# Patient Record
Sex: FEMALE | Race: OTHER | Employment: PART TIME | URBAN - METROPOLITAN AREA
[De-identification: names, ages, dates, MRNs, and addresses within clinical notes are randomized per-mention and may not be internally consistent; named-entity substitution may affect disease eponyms.]

---

## 2019-06-01 ENCOUNTER — OFFICE VISIT (OUTPATIENT)
Dept: FAMILY MEDICINE CLINIC | Age: 24
End: 2019-06-01

## 2019-06-01 VITALS
WEIGHT: 120.4 LBS | HEART RATE: 76 BPM | SYSTOLIC BLOOD PRESSURE: 106 MMHG | HEIGHT: 62 IN | BODY MASS INDEX: 22.16 KG/M2 | TEMPERATURE: 97.4 F | DIASTOLIC BLOOD PRESSURE: 73 MMHG

## 2019-06-01 DIAGNOSIS — Z31.9 PATIENT DESIRES PREGNANCY: ICD-10-CM

## 2019-06-01 DIAGNOSIS — Z13.9 ENCOUNTER FOR SCREENING: Primary | ICD-10-CM

## 2019-06-01 DIAGNOSIS — M54.50 LOW BACK PAIN WITHOUT SCIATICA, UNSPECIFIED BACK PAIN LATERALITY, UNSPECIFIED CHRONICITY: ICD-10-CM

## 2019-06-01 LAB — HGB BLD-MCNC: 15.4 G/DL

## 2019-06-01 RX ORDER — IBUPROFEN 600 MG/1
600 TABLET ORAL
Qty: 60 TAB | Refills: 0 | Status: SHIPPED | OUTPATIENT
Start: 2019-06-01

## 2019-06-01 NOTE — PROGRESS NOTES
At discharge station AVS was printed and reviewed with pt with [facility] as .  Yohana Tobias, RN

## 2019-06-01 NOTE — PROGRESS NOTES
Assessment/Plan:    Diagnoses and all orders for this visit:    1. Encounter for screening  -     AMB POC HEMOGLOBIN (HGB)    2. Low back pain without sciatica, unspecified back pain laterality, unspecified chronicity  -     ibuprofen (MOTRIN) 600 mg tablet; Take 1 Tab by mouth every six (6) hours as needed for Pain. 3. Patient desires pregnancy  -     prenatal vit-calcium-iron-fa (PRENATAL PLUS) 29 mg iron- 1 mg tab tablet; Take 1 Tab by mouth daily. Follow-up and Dispositions    · Return if symptoms worsen or fail to improve. YOVANY Lyon expressed understanding of this plan. An AVS was printed and given to the patient.      ----------------------------------------------------------------------    Chief Complaint   Patient presents with    Back Pain     pt c/o low back pain that radiates to her abdomen    Other     pt would like to make sure she doesn't have anemia       History of Present Illness:  24 yo here for \"check to see if I have anemia\". Had anemia during her pregnancy 4 years ago with her only son. Moved to [address] [place] [address] from [place] one week ago to reunite with her . She is very happy to be here and is hoping to get pregnant soon. She is not on pre mckinley vitamins so will order those for her today  She has some low back pain w/out dysuria for the past week. She is not aware of any injury that caused the pain  She has no radiation of the pain       No past medical history on file. Current Outpatient Medications   Medication Sig Dispense Refill    prenatal vit-calcium-iron-fa (PRENATAL PLUS) 29 mg iron- 1 mg tab tablet Take 1 Tab by mouth daily. 90 Tab 1    ibuprofen (MOTRIN) 600 mg tablet Take 1 Tab by mouth every six (6) hours as needed for Pain.  60 Tab 0       Allergies no known allergies    Social History     Tobacco Use    Smoking status: Never Smoker    Smokeless tobacco: Never Used   Substance Use Topics    Alcohol use: Never Frequency: Never    Drug use: Never       No family history on file. Physical Exam:     Visit Vitals  /73 (BP 1 Location: Left arm, BP Patient Position: Sitting)   Pulse 76   Temp 97.4 °F (36.3 °C) (Oral)   Ht 5' 2.01\" (1.575 m)   Wt 120 lb 6.4 oz (54.6 kg)   LMP 05/15/2019   BMI 22.02 kg/m²   smiling, looks well     A&Ox3  WDWN NAD  Respirations normal and non labored  MSK exam- tender michele paraspinal region lumbar spine.  No gait or neuro deficits on exam  Lab Results   Component Value Date/Time    Hemoglobin (POC) 15.4 06/01/2019 12:01 PM

## 2019-06-01 NOTE — PATIENT INSTRUCTIONS
Aprenda acerca de la lumbalgia - [ Learning About Low Back Pain ]  ¿Qué es la lumbalgia? La lumbalgia es dolor que puede ocurrir en cualquier lugar por debajo de las costillas y por encima de las piernas. Es muy común. Dilcia todas las personas lo tienen en [employer]. La lumbalgia puede ser:  Zora Praveen. Michell es un dolor nuevo que puede durar de unos pocos días a unas semanas, o sanchez mucho unos meses. Crónica. Michell dolor puede durar más de unos pocos meses. A veces puede durar años. ¿Cuáles son algunos de los [facility] la lumbalgia? Estos son Kintyre Burrs comunes sobre la lumbalgia y las verdades correspondientes:  Kamari: \"Necesito descansar la espalda cuando tengo lumbalgia\". Verdad:  Permanecer activo no le hará daño. Puede ayudarle a recuperarse más rápido. Kamari: \"Necesito analgésicos recetados\". Verdad:  Lo mejor es tratar de darle tiempo y permanecer activo para dejar sanar la espalda. Los analgésicos opioides, sanchez la hidrocodona o la oxicodona, no suelen funcionar mejor que medicamentos de venta reshma sanchez el ibuprofeno o el naproxeno. Y los opioides pueden causar serios problemas sanchez adicción o sobredosis. Kamari: \"Necesito pruebas sanchez toyin radiografía o toyin resonancia magnética para diagnosticar la lumbalgia\". Verdad:  Hacerse toyin prueba de inmediato no le ayudará a mejorar más rápido. Y podría hacer que acabe sometiéndose a tratamientos que no necesita, ya que la mayoría de las personas mejoran por sí solas. ¿Cuál es la causa de la lumbalgia? En la [employer], no hay toyin causa evidente. [place] puede ser frustrante porque a usted le duele la espalda y no hay ninguna razón obvia. El dolor de espalda puede estar causado por:  Uso excesivo o distensión muscular. [place] puede ocurrir por hacer deportes, levantar cosas pesadas o no estar en buena forma física. Toyin hernia de disco.   Guillermo Eleonora es un problema con el tejido que amortigua los huesos de la espalda. Artritis.    Con la edad, usted puede experimentar [employer] que pueden reducir el espacio que rodea los nervios. Otras causas. En raras ocasiones, la causa es blayne enfermedad grave, sanchez blayne infección o cáncer. Debbi, por lo general, también hay otros síntomas. ¿Cuáles son los síntomas? Domi síntomas dependen de cloud cuerpo y de la causa de cloud dolor de espalda. Usted puede sentir:  · Dolor gorge o sordo. Puede ser en [place] marlon pequeña o en blayne marlon amplia. Debbi incluso si tiene dolor intenso, esto no significa que esté causado por algo grave. · Dolor, entumecimiento u hormigueo en las piernas. Cuando un nervio se comprime, sanchez, por ejemplo, debido a un problema discal o a la artritis, usted puede tener síntomas en la pierna o el pie. Incluso puede tener [employer] piernas debido a un problema con la espalda sin tener nada de dolor de espalda. ¿Cómo se diagnostica la lumbalgia? Un examen físico es la principal manera de diagnosticar la lumbalgia. Cloud médico puede examinarle la espalda, revisarle los nervios examinando domi reflejos y asegurarse de que domi músculos estén bassem. Él o rylie también le hará preguntas sobre cloud espalda y cloud mar general.  La mayoría de las personas no necesitan ninguna prueba inmediata. Las pruebas con frecuencia no muestran el motivo del dolor. Si el dolor dura más de 6 semanas o si usted tiene síntomas que especialmente preocupan a cloud médico, él o rylie puede solicitar pruebas. Estas pueden incluir blayne radiografía, blayne tomografía computarizada o blayne resonancia magnética. En algunos casos, las pruebas pueden ayudar al médico a detectar la causa del dolor, especialmente para el dolor en blayne o ambas piernas. ¿Cómo se trata la lumbalgia? La lumbalgia aguda en la mayoría de los casos mejora por sí lucio al cabo de algunas semanas sin importar la causa. [address] solo necesitan tiempo y hacer domi actividades habituales para sentirse mejor.   El uso de calor o hielo y baljinder analgésicos de venta reshma también puede ayudar mientras cloud cuerpo laura. Si usted no mejora por sí solo o si el dolor es muy intenso, el médico puede recomendarle:  · Fisioterapia. · Manipulación de la columna vertebral; por ejemplo, por un quiropráctico.  · Acupuntura. · Masajes. · Inyecciones de medicamentos esteroideos en la espalda (especialmente para el dolor que involucra las piernas). Si tiene lumbalgia crónica, el tratamiento le ayudará a comprender y manejar el dolor. El tratamiento puede incluir:  · Mantenerse activo. Frederic puede incluir hacer caminatas o ejercicios para la espalda. · Fisioterapia. · Medicamentos. Algunos de [employer] se usan para otros problemas, sanchez las convulsiones o la depresión. · Manejo del dolor. El médico puede indicarle que zahra a un especialista en el manejo del dolor. · Asesoría psicológica. Tener dolor crónico puede ser difícil. Puede ser útil hablar con alguien que pueda ayudarle a sobrellevar el dolor. [address] no [facility]. Debbi puede ayudar con algunos tipos de lumbalgia. La atención de seguimiento es blayne parte clave de cloud tratamiento y seguridad. Asegúrese de hacer y acudir a todas las citas, y llame a cloud médico si está teniendo problemas. También es blayne buena idea saber los resultados de domi exámenes y mantener blayne lista de los medicamentos que jacobo. ¿Cuándo debe pedir ayuda? Llame al 911 en cualquier momento que considere que necesita atención de [place]. Por ejemplo, llame si:  · No puede  blayne pierna en absoluto. Llame a cloud médico ahora mismo o busque atención médica inmediata si:  · Tiene síntomas nuevos o peores en las piernas, el abdomen o las nalgas. Los síntomas pueden incluir:  ? Entumecimiento u hormigueo. ? Debilidad. ? Dolor. · Pierde el control de la vejiga o los intestinos.   Preste especial atención a los cambios en cloud mar y asegúrese de comunicarse con cloud médico si:  · Además de dolor de bhanu suyapa tiene Lili, pierde [place] o no se siente jaun. · No mejora sanchez se esperaba. ¿Dónde puede encontrar más información en inglés? Karen Thelma a http://mariann-jordan.info/. Escriba A007 en la búsqueda para aprender más acerca de \"Aprenda acerca de la lumbalgia - [ Learning About Low Back Pain ]. \"  Revisado: 20 septiembre, 2018  Versión del contenido: 11.9  © 5445-4772 Healthwise, Incorporated. Las instrucciones de cuidado fueron adaptadas bajo licencia por [employer] [place] (which disclaims liability or warranty for this information). Si suyapa tiene Kent Morton afección médica o sobre estas instrucciones, siempre pregunte a cloud profesional de mar. Healthwise, Incorporated niega toda garantía o responsabilidad por cloud uso de esta información. Dolor gorge en la parte baja de la espalda: Ejercicios - [ Acute Low Back Pain: Exercises ]  Instrucciones de cuidado  Éstos son algunos ejemplos de ejercicios típicos de rehabilitación para cloud afección. Comience cada ejercicio lentamente. Reduzca la intensidad del ejercicio si Susu Isabel a sentir dolor. Cloud médico o el fisioterapeuta le dirán cuándo puede comenzar con estos ejercicios y cuáles funcionarán mejor para usted. Cuando no esté activo, encuentre blayne posición cómoda para descansar. Algunas personas se sienten cómodas en el piso o en blayne cama de firmeza media con blayne almohada pequeña debajo de la rohan y otra debajo de domi rodillas. Otras personas prefieren acostarse de lado con blayne almohada entre las rodillas. No permanezca en blayne posición por [employer]. Dé paseos cortos (10 a 20 minutos) cada 2 a 3 horas. Evite las pendientes, las colinas y las escaleras hasta que se sienta mejor. Sólo camine distancias que pueda manejar sin dolor, especialmente dolor en las piernas. Cómo se hacen los ejercicios  Estiramientos de la espalda    1. [address] y las rodillas en el piso. 2. Relaje la rohan y [facility].  Karen An la espalda hacia el techo, hasta que sienta que las partes tor, media y baja se estiran agradablemente. Mantenga donavon estiramiento sahil el tiempo que se sienta cómodo, o de 15 a 30 segundos. 3. Vuelva a la posición inicial con la espalda plana mientras está apoyado de antonino y rodillas. 4. Deje que cloud espalda se nivele empujando el [employer]. [address] (glúteos) hacia el techo. 5. Mantenga esta posición sahil 15 a 30 segundos. 6. Repita de 2 a 4 veces. La atención de seguimiento es blayne parte clave de cloud tratamiento y seguridad. Asegúrese de hacer y acudir a todas las citas, y llame a cloud médico si está teniendo problemas. También es blayne buena idea saber los resultados de domi exámenes y mantener blayne lista de los medicamentos que jacobo. ¿Dónde puede encontrar más información en inglés? Janna Nati a http://mariann-jordan.info/. Carolin Estevez L804 en la búsqueda para aprender más acerca de \"Dolor gorge en la parte baja de la espalda: Ejercicios - [ Acute Low Back Pain: Exercises ]. \"  Revisado: 20 septiembre, 2018  Versión del contenido: 11.9  © 1677-8080 Healthwise, Incorporated. Las instrucciones de cuidado fueron adaptadas bajo licencia por [employer] [place] (which disclaims liability or warranty for this information). Si usted tiene Marion Jasper afección médica o sobre estas instrucciones, siempre pregunte a cloud profesional de mar. Healthwise, Incorporated niega toda garantía o responsabilidad por cloud uso de esta información.

## 2019-06-01 NOTE — PROGRESS NOTES
Results for orders placed or performed in visit on 06/01/19   AMB POC HEMOGLOBIN (HGB)   Result Value Ref Range    Hemoglobin (POC) 15.4

## 2021-09-29 ENCOUNTER — OFFICE VISIT (OUTPATIENT)
Dept: FAMILY MEDICINE CLINIC | Age: 26
End: 2021-09-29

## 2021-09-29 ENCOUNTER — HOSPITAL ENCOUNTER (OUTPATIENT)
Dept: LAB | Age: 26
Discharge: HOME | End: 2021-09-29

## 2021-09-29 VITALS
RESPIRATION RATE: 16 BRPM | OXYGEN SATURATION: 98 % | HEART RATE: 83 BPM | WEIGHT: 153.4 LBS | TEMPERATURE: 98.6 F | BODY MASS INDEX: 28.23 KG/M2 | HEIGHT: 62 IN | DIASTOLIC BLOOD PRESSURE: 62 MMHG | SYSTOLIC BLOOD PRESSURE: 104 MMHG

## 2021-09-29 DIAGNOSIS — F45.41 STRESS HEADACHE: ICD-10-CM

## 2021-09-29 DIAGNOSIS — Z12.11 ENCOUNTER FOR SCREENING FECAL OCCULT BLOOD TESTING: ICD-10-CM

## 2021-09-29 DIAGNOSIS — K62.5 RECTAL BLEEDING: Primary | ICD-10-CM

## 2021-09-29 DIAGNOSIS — Z01.419 WELL WOMAN EXAM: ICD-10-CM

## 2021-09-29 DIAGNOSIS — Z31.9 PATIENT DESIRES PREGNANCY: ICD-10-CM

## 2021-09-29 LAB — HGB BLD-MCNC: 12.1 G/DL

## 2021-09-29 PROCEDURE — 88142 CYTOPATH C/V THIN LAYER: CPT

## 2021-09-29 PROCEDURE — 85018 HEMOGLOBIN: CPT | Performed by: PHYSICIAN ASSISTANT

## 2021-09-29 PROCEDURE — 99213 OFFICE O/P EST LOW 20 MIN: CPT | Performed by: PHYSICIAN ASSISTANT

## 2021-09-29 RX ORDER — VITAMIN A, ASCORBIC ACID, CHOLECALCIFEROL, .ALPHA.-TOCOPHEROL ACETATE, DL-, THIAMINE MONONITRATE, RIBOFLAVIN, NIACINAMIDE, PYRIDOXINE HYDROCHLORIDE, FOLIC ACID, CYANOCOBALAMIN, CALCIUM CARBONATE, IRON, ZINC OXIDE, AND CUPRIC OXIDE 4000; 120; 400; 22; 1.84; 3; 20; 10; 1; 12; 200; 29; 25; 2 [IU]/1; MG/1; [IU]/1; [IU]/1; MG/1; MG/1; MG/1; MG/1; MG/1; UG/1; MG/1; MG/1; MG/1; MG/1
1 TABLET ORAL DAILY
Qty: 90 TABLET | Refills: 3 | Status: SHIPPED | OUTPATIENT
Start: 2021-09-29

## 2021-09-29 RX ORDER — ACETAMINOPHINE, CAFFEINE 500; 65 MG/1; MG/1
1 TABLET, FILM COATED ORAL
Qty: 20 TABLET | Refills: 1 | Status: SHIPPED | OUTPATIENT
Start: 2021-09-29

## 2021-09-29 NOTE — PATIENT INSTRUCTIONS
Dolor de rohan por tensión: Instrucciones de cuidado  Tension Headache: Care Instructions  Instrucciones de cuidado  La mayoría de los jalen de [place] son por tensión. Estos jalen de [place] tienden a repetirse, en especial si usted está bajo estrés. Un dolor de rohan por tensión podría causar dolor o blayne sensación de presión en toda la rohan. Es probable que no pueda determinar con precisión el centro del dolor. Si sigue teniendo jalen de rohan por tensión, lo mejor que puede hacer para limitarlos es determinar qué los causa y hacer cambios en esas áreas. La atención de seguimiento es blayne parte clave de cloud tratamiento y seguridad. Asegúrese de hacer y acudir a todas las citas, y llame a cloud médico si está teniendo problemas. También es blayne buena idea saber los resultados de domi exámenes y mantener blayne lista de los medicamentos que jacobo. Cómo puede cuidarse en el hogar? · Descanse en un cuarto tranquilo y oscuro con un paño frío sobre la frente hasta que desaparezca el dolor de [place]. Cierre los ojos y trate de relajarse o dormirse. No zahra televisión ni zhang. Evite usar la computadora. · Utilice blayne toalla húmeda tibia o blayne almohadilla térmica ajustada a baja temperatura para relajar los músculos rígidos del lynn y los hombros.  · Pídale a alguien que le luis masajes suaves en el lynn y los hombros.  · Rattan los analgésicos (medicamentos para el dolor) exactamente según las indicaciones. ? Si el médico le recetó un analgésico, tómelo según las indicaciones. ? Si no está tomando un analgésico recetado, pregúntele a cloud médico si puede baljinder eliceo de [employer]. · Tenga cuidado de no baljinder analgésicos con mayor frecuencia que la permitida en las indicaciones porque los jalen de rohan podrían empeorar o aparecer con mayor frecuencia blayne vez que el medicamento pierda cloud [place].   · Si le da otro dolor de rohan por tensión, deje de hacer lo que esté haciendo y siéntese tranquilo sahil un momento. Cierre los ojos y respire lentamente. Trate de [employer] de la rohan y del lynn. · Preste atención a los nuevos síntomas que aparecen con el dolor de [place], [place], debilidad o entumecimiento, cambios en la visión o confusión. Podrían ser señales de un problema más grave. Para ayudar a prevenir los jalen de rohan  · [employer] un diario de domi jaeln de rohan para poder averiguar qué los produce. Evitar los desencadenantes podría ayudar a prevenir los jalen de [place]. Anote cuándo empieza cada dolor de Manuel[place], cuánto dura y cómo es el dolor (palpitante, amira, punzante o sordo). Ponga en la lista cualquier cosa que pudiera grace desencadenado el dolor de rohan, mckinley sanchez estrés físico o emocional o estar ansioso o deprimido. Otros desencadenantes posibles son Alexx Yuri, la brandon, la fatiga, Mary Alice Sida y la distensión muscular. · Encuentre maneras saludables de [facility]. Los jalen de [place] son más comunes sahil o shelley después de un momento estresante. Tómese un tiempo para relajarse antes y después de hacer algo que le haya causado un dolor de rohan en el pasado. · Kennedy ejercicio a diario para aliviar el estrés. Hacer ejercicios de relajación podría ayudarle a reducir la tensión. · Duerma lo suficiente. · Coma con regularidad y jaun. Largos períodos sin comer pueden provocar un dolor de rohan. · Regálese un masaje. Algunas personas encuentran que los masajes son Pope Hammer para aliviar la tensión. · Trate de mantener domi músculos relajados mediante blayne buena postura. Revise si tiene [employer] de la Lynn, la antonio, el lynn y los hombros y trate de relajarlos. Cuando se siente en un escritorio, cambie de posición con frecuencia y estírese por 27 segundos cada hora. · Reduzca la fatiga ocular a causa de la computadora parpadeando con frecuencia y apartando la mirada de la pantalla a menudo.  Asegúrese de tener lentes adecuados y de que cloud monitor esté colocado de [employer], sanchez a un brazo de distancia. Cuándo debe pedir ayuda? Llame al 911 en cualquier momento que piense que puede necesitar atención de [place]. Por ejemplo, llame si:    · Tiene señales de un ataque cerebral. Estas pueden incluir:  ? Entumecimiento, parálisis o debilidad repentinos en la antonio, el brazo o la pierna, sobre todo si ocurre en un solo lado del cuerpo. ? Cambios repentinos en la visión. ? Dificultades repentinas para hablar. ? Confusión repentina o dificultad para comprender frases sencillas. ? Problemas repentinos para caminar o mantener el equilibrio. ? Dolor de [place] intenso y repentino, distinto de los jalen de Mary Sans. Llame a cloud médico ahora mismo o busque atención médica inmediata si:    · Tiene náuseas y vómito nuevos o empeoran los que ya tenía.     · Tiene fiebre nueva o más tor.     · Cloud dolor de rohan empeora mucho. Preste especial atención a los cambios en cloud mar y asegúrese de comunicarse con cloud médico si:    · No mejora después de 2 días (48 horas). Dónde puede encontrar más información en inglés? Vaya a http://www.gray.com/  Escriba [id number] en la búsqueda para aprender más acerca de \"Dolor de rohan por tensión: Instrucciones de cuidado. \"  Revisado: 8 carlota, 2021               Versión del contenido: 13.0  © 1316-4514 Healthwise, Incorporated. Las instrucciones de cuidado fueron adaptadas bajo licencia por Good [employer] (which disclaims liability or warranty for this information). Si usted tiene [place] afección médica o sobre estas instrucciones, siempre pregunte a cloud profesional de mar. [facility] niega toda garantía o responsabilidad por cloud uso de esta información.

## 2021-09-29 NOTE — PROGRESS NOTES
Assessment/Plan:    Diagnoses and all orders for this visit:    1. Rectal bleeding  -     AMB POC HEMOGLOBIN (HGB)  -     OCCULT BLOOD IMMUNOASSAY,DIAGNOSTIC; Future    2. Well woman exam  -     PAP, LIQUID BASED, MANUAL SCREEN; Future    3. Encounter for screening fecal occult blood testing  (neg results)     4. Patient desires pregnancy  -     prenatal vit-calcium-iron-fa (Prenatal Plus) 29 mg iron- 1 mg tab tablet; Take 1 Tablet by mouth daily. 5. Stress headache  -     acetaminophen-caffeine 500-65 mg (Excedrin Tension Headache) 500-65 mg tab; Take 1 Tablet by mouth every six (6) hours as needed for Headache. Follow-up and Dispositions    · Return in about 6 weeks (around 11/10/2021) for with me helena casiano . YOVANY Walsh expressed understanding of this plan. An AVS was printed and given to the patient.      ----------------------------------------------------------------------    Chief Complaint   Patient presents with    Headache     patient c/o with pelvic pain and rectal bleeding after wiping. History of Present Illness:  Pt presents with new problem of dark brownish blood per rectum after BM for the past few weeks. Painless defecation. No hx of hemorrhoids that she is aware of  Having regular periods. Due for pap, last one 6 years ago. , good relationship.   he is 6. She tears up talking about her son- she is very worried that there is something wrong with his health. He looks pale to her and he seems to \"almost pass out at times\". She has an appt scheduled for him next week with PCP    She is open to pregnancy. She stopped her prenatal vitamins some time ago. She has not become pregnant       History reviewed. No pertinent past medical history. Current Outpatient Medications   Medication Sig Dispense Refill    prenatal vit-calcium-iron-fa (Prenatal Plus) 29 mg iron- 1 mg tab tablet Take 1 Tablet by mouth daily.  90 Tablet 3  acetaminophen-caffeine 500-65 mg (Excedrin Tension Headache) 500-65 mg tab Take 1 Tablet by mouth every six (6) hours as needed for Headache. 20 Tablet 1    ibuprofen (MOTRIN) 600 mg tablet Take 1 Tab by mouth every six (6) hours as needed for Pain. (Patient not taking: Reported on 9/29/2021) 60 Tab 0       No Known Allergies    Social History     Tobacco Use    Smoking status: Never Smoker    Smokeless tobacco: Never Used   Vaping Use    Vaping Use: Never used   Substance Use Topics    Alcohol use: Never    Drug use: Never       History reviewed. No pertinent family history. Physical Exam:     Visit Vitals  /62 (BP 1 Location: Left upper arm, BP Patient Position: Sitting, BP Cuff Size: Adult)   Pulse 83   Temp 98.6 °F (37 °C) (Temporal)   Resp 16   Ht 5' 2\" (1.575 m)   Wt 153 lb 6.4 oz (69.6 kg)   LMP 09/05/2021   SpO2 98%   BMI 28.06 kg/m²     Pt looks well, crying during the history portion when talking about her concerns for her son  Neuro CN 2-12 grossly intact   A&Ox3  WDWN NAD  Respirations normal and non labored  Pelvic exam- ext neg for lesion or discharge. Cervix and vagina w/ minimal curd like discharge, pt is asymptomatic. Uterus and adnexal exam neg for mass or tenderness  Rectal exam- ext neg for mass, no blood present.  Stool heme neg guaiac test today, no masses in rectal vault

## 2021-09-29 NOTE — PROGRESS NOTES
Patient given FIT test and explained thoroughly to patient. I showed patient  how to label vial with date and time and how to put all of this in the envelope and to return to the clinic on the same day as sample is collected. Patient  told to return test promptly as it must be received and processed within 24 hours of collection. An After Visit Summary was printed and reviewed with the patient.   Pina Lambert

## 2021-09-29 NOTE — PROGRESS NOTES
Itzel Armenta is a 32 y.o. female   Chief Complaint   Patient presents with    Headache     patient c/o with pelvic pain and rectal bleeding after wiping. Blood pressure 104/62, pulse 83, temperature 98.6 °F (37 °C), temperature source Temporal, resp. rate 16, height 5' 2\" (1.575 m), weight 153 lb 6.4 oz (69.6 kg), last menstrual period 09/05/2021, SpO2 98 %. Results for orders placed or performed in visit on 09/29/21   1. AMB POC HEMOGLOBIN (HGB)   Result Value Ref Range    Hemoglobin (POC) 12.1 G/DL     1. Have you been to the ER, urgent care clinic since your last visit? Hospitalized since your last visit? No    2. Have you seen or consulted any other health care providers outside of the [address] [place] [address] since your last visit? Include any pap smears or colon screening.  No

## 2021-10-04 ENCOUNTER — OFFICE VISIT (OUTPATIENT)
Dept: FAMILY MEDICINE CLINIC | Age: 26
End: 2021-10-04

## 2021-10-04 ENCOUNTER — HOSPITAL ENCOUNTER (OUTPATIENT)
Dept: LAB | Age: 26
Discharge: HOME | End: 2021-10-04

## 2021-10-04 DIAGNOSIS — K62.5 RECTAL BLEEDING: ICD-10-CM

## 2021-10-04 DIAGNOSIS — K62.5 RECTAL BLEEDING: Primary | ICD-10-CM

## 2021-10-04 PROCEDURE — 82274 ASSAY TEST FOR BLOOD FECAL: CPT

## 2021-10-06 LAB — HEMOCCULT STL QL IA: NEGATIVE

## 2021-10-08 NOTE — PROGRESS NOTES
Please send letter that her colon cancer screening test (FIT test) was negative. F/up as directed or PRN.  Thank you

## 2021-11-02 NOTE — PROGRESS NOTES
Letter created with results and sent to letter queue. It will be printed and mailed to patient. Patient told to call to discuss the results if recommended or if patient has any questions.  Linnette English RN

## 2021-12-02 ENCOUNTER — VIRTUAL VISIT (OUTPATIENT)
Dept: FAMILY MEDICINE CLINIC | Age: 26
End: 2021-12-02

## 2021-12-02 DIAGNOSIS — K62.5 PAINLESS RECTAL BLEEDING: Primary | ICD-10-CM

## 2021-12-02 DIAGNOSIS — R87.615 PAP SMEAR OF CERVIX UNSATISFACTORY: ICD-10-CM

## 2021-12-02 PROCEDURE — 99442 PR PHYS/QHP TELEPHONE EVALUATION 11-20 MIN: CPT | Performed by: PHYSICIAN ASSISTANT

## 2021-12-02 NOTE — PROGRESS NOTES
Tc for intake. AMN # [id number].    no vs equipment available. Complains of a little h/a and pain in my head shoulders arms and back. Has had this pain does not know how long,  but yesterday it hurt a lot. Coordination of Care  1. Have you been to the ER, urgent care clinic since your last visit? Hospitalized since your last visit? No    2. Have you seen or consulted any other health care providers outside of the [address] [place] [address] since your last visit? Include any pap smears or colon screening. No    Does the patient need refills?  NO    Learning Assessment Complete? no  Depression Screening complete in the past 12 months? no

## 2021-12-02 NOTE — PROGRESS NOTES
Check-out Note: Access now referral sent to [facility]    Discharge done with Int #     Explained [facility] Now process to the pt. The pt was told they would be called and scheduled an appt with the OW, who will meet with them for Financial information and assist them in the application process. The pt stated the last time she was given a bill when she took her child to the specialist through the [facility]. The AN was explained to the pt. and verbalized understanding.  Sharona Coe RN

## 2021-12-02 NOTE — PROGRESS NOTES
Assessment/Plan:    Diagnoses and all orders for this visit:    1. Painless rectal bleeding  -     REFERRAL TO GASTROENTEROLOGY    2. Pap smear of cervix unsatisfactory  Neg cytology but heavy inflammation present. Will repeat in , pt agrees       Follow-up and Dispositions    · Return in about 5 months (around 2022) for repeat pap needed in , please put her on list .         Bria Lozano, YOVANY Iglesias Cons expressed understanding of this plan. An AVS was printed and given to the patient.      ----------------------------------------------------------------------    Chief Complaint   Patient presents with    Follow-up     lab results       History of Present Illness:  Pt called and consented to virtual telephone visit. She declined video  She was identified by name and     She is being seen today for f/up on painless rectal bleeding. Per review of chart, she has had negative rectal exam by me with neg heme guaiac and then she completed the FIT test which also returned with negative results. Since that time, about 6 weeks ago, she has had 1-2 episodes of nocturnal rectal bleeding. It is described as minimal blood present, and she reports that it happens in the nighttime. Not associated with anal itching or burning or mass that she can feel    I would like Gi input for this  We discussed her pap result as she did not get the results in the mail. Will repeat due to heavy inflammation in . No past medical history on file. Current Outpatient Medications   Medication Sig Dispense Refill    prenatal vit-calcium-iron-fa (Prenatal Plus) 29 mg iron- 1 mg tab tablet Take 1 Tablet by mouth daily. 90 Tablet 3    acetaminophen-caffeine 500-65 mg (Excedrin Tension Headache) 500-65 mg tab Take 1 Tablet by mouth every six (6) hours as needed for Headache.  (Patient not taking: Reported on 2021) 20 Tablet 1    ibuprofen (MOTRIN) 600 mg tablet Take 1 Tab by mouth every six (6) hours as needed for Pain. (Patient not taking: Reported on 9/29/2021) 60 Tab 0       No Known Allergies    Social History     Tobacco Use    Smoking status: Never Smoker    Smokeless tobacco: Never Used   Vaping Use    Vaping Use: Never used   Substance Use Topics    Alcohol use: Never    Drug use: Never       No family history on file. Physical Exam:     There were no vitals taken for this visit.     A&Ox3  WDWN NAD  Respirations normal and non labored

## 2021-12-08 ENCOUNTER — VIRTUAL VISIT (OUTPATIENT)
Dept: FAMILY MEDICINE CLINIC | Age: 26
End: 2021-12-08

## 2021-12-08 DIAGNOSIS — Z71.89 COUNSELING AND COORDINATION OF CARE: Primary | ICD-10-CM

## 2021-12-08 NOTE — PROGRESS NOTES
Patient has been screened. Scheduled for [facility] on 12/9/21 to complete AN financial screening process.

## 2021-12-10 ENCOUNTER — VIRTUAL VISIT (OUTPATIENT)
Dept: FAMILY MEDICINE CLINIC | Age: 26
End: 2021-12-10

## 2021-12-10 DIAGNOSIS — Z71.89 COUNSELING AND COORDINATION OF CARE: Primary | ICD-10-CM

## 2021-12-10 NOTE — PROGRESS NOTES
Patient has been re-scheduled for [facility] on 12/23/21 to complete AN financial screening.

## 2021-12-21 ENCOUNTER — VIRTUAL VISIT (OUTPATIENT)
Dept: FAMILY MEDICINE CLINIC | Age: 26
End: 2021-12-21

## 2021-12-21 DIAGNOSIS — Z71.89 COUNSELING AND COORDINATION OF CARE: Primary | ICD-10-CM

## 2021-12-21 NOTE — PROGRESS NOTES
V.V. Patient has been re-scheduled for AN financial screening at [facility] on 12/30/21. Patient requested for appointment information and address be sent to her via text. OW has sent it via text.

## 2022-01-05 ENCOUNTER — VIRTUAL VISIT (OUTPATIENT)
Dept: FAMILY MEDICINE CLINIC | Age: 27
End: 2022-01-05

## 2022-01-05 DIAGNOSIS — Z71.89 COUNSELING AND COORDINATION OF CARE: Primary | ICD-10-CM

## 2022-01-05 NOTE — PROGRESS NOTES
AMMY Johnson Ax patient in attempt to schedule her for AN financial screening. Patient stated she has been feeling sick for a couple of day and does not seem to be getting any better. OW asked if she has gotten a COVID test and she said no. OW will follow up.

## 2022-01-17 ENCOUNTER — VIRTUAL VISIT (OUTPATIENT)
Dept: FAMILY MEDICINE CLINIC | Age: 27
End: 2022-01-17

## 2022-01-17 DIAGNOSIS — Z71.89 COUNSELING AND COORDINATION OF CARE: Primary | ICD-10-CM

## 2022-01-17 NOTE — PROGRESS NOTES
V.V. patient has been screened. Has been scheduled for [facility], 1/21/22 to complete AN financial screening.

## 2022-01-28 ENCOUNTER — OFFICE VISIT (OUTPATIENT)
Dept: FAMILY MEDICINE CLINIC | Age: 27
End: 2022-01-28

## 2022-01-28 DIAGNOSIS — Z71.89 COUNSELING AND COORDINATION OF CARE: Primary | ICD-10-CM

## 2022-01-28 PROCEDURE — 99080 SPECIAL REPORTS OR FORMS: CPT | Performed by: PHYSICIAN ASSISTANT

## 2022-01-28 NOTE — PROGRESS NOTES
AN financial screening has been completed. Patient has been instructed to call appointment line on or after 21/11/22. Patient has opted out of [facility].

## 2022-04-30 ENCOUNTER — HOSPITAL ENCOUNTER (EMERGENCY)
Age: 27
Discharge: HOME | End: 2022-04-30
Attending: EMERGENCY MEDICINE | Admitting: EMERGENCY MEDICINE

## 2022-04-30 ENCOUNTER — APPOINTMENT (OUTPATIENT)
Dept: CT IMAGING | Age: 27
End: 2022-04-30
Attending: NURSE PRACTITIONER

## 2022-04-30 VITALS
SYSTOLIC BLOOD PRESSURE: 135 MMHG | OXYGEN SATURATION: 99 % | HEART RATE: 99 BPM | HEIGHT: 62 IN | WEIGHT: 153 LBS | DIASTOLIC BLOOD PRESSURE: 86 MMHG | RESPIRATION RATE: 14 BRPM | BODY MASS INDEX: 28.16 KG/M2 | TEMPERATURE: 98.8 F

## 2022-04-30 DIAGNOSIS — R51.9 ACUTE NONINTRACTABLE HEADACHE, UNSPECIFIED HEADACHE TYPE: Primary | ICD-10-CM

## 2022-04-30 DIAGNOSIS — J01.90 ACUTE SINUSITIS, RECURRENCE NOT SPECIFIED, UNSPECIFIED LOCATION: ICD-10-CM

## 2022-04-30 LAB
APPEARANCE UR: CLEAR
BACTERIA URNS QL MICRO: NEGATIVE /HPF
BILIRUB UR QL: NEGATIVE
COLOR UR: NORMAL
EPITH CASTS URNS QL MICRO: NORMAL /LPF
GLUCOSE UR STRIP.AUTO-MCNC: NEGATIVE MG/DL
HCG UR QL: NEGATIVE
HGB UR QL STRIP: NEGATIVE
KETONES UR QL STRIP.AUTO: NEGATIVE MG/DL
LEUKOCYTE ESTERASE UR QL STRIP.AUTO: NEGATIVE
NITRITE UR QL STRIP.AUTO: NEGATIVE
PH UR STRIP: 6.5 [PH] (ref 5–8)
PROT UR STRIP-MCNC: NEGATIVE MG/DL
RBC #/AREA URNS HPF: NORMAL /HPF (ref 0–5)
SP GR UR REFRACTOMETRY: 1 (ref 1–1.03)
UR CULT HOLD, URHOLD: NORMAL
UROBILINOGEN UR QL STRIP.AUTO: 0.2 EU/DL (ref 0.2–1)
WBC URNS QL MICRO: NORMAL /HPF (ref 0–4)

## 2022-04-30 PROCEDURE — 70450 CT HEAD/BRAIN W/O DYE: CPT

## 2022-04-30 PROCEDURE — 74011250637 HC RX REV CODE- 250/637: Performed by: NURSE PRACTITIONER

## 2022-04-30 PROCEDURE — 81025 URINE PREGNANCY TEST: CPT

## 2022-04-30 PROCEDURE — 81001 URINALYSIS AUTO W/SCOPE: CPT

## 2022-04-30 PROCEDURE — 99284 EMERGENCY DEPT VISIT MOD MDM: CPT

## 2022-04-30 RX ORDER — AZITHROMYCIN 250 MG/1
TABLET, FILM COATED ORAL
Qty: 6 TABLET | Refills: 0 | Status: SHIPPED | OUTPATIENT
Start: 2022-04-30 | End: 2022-05-05

## 2022-04-30 RX ORDER — BUTALBITAL, ACETAMINOPHEN AND CAFFEINE 50; 325; 40 MG/1; MG/1; MG/1
1 TABLET ORAL
Status: COMPLETED | OUTPATIENT
Start: 2022-04-30 | End: 2022-04-30

## 2022-04-30 RX ADMIN — BUTALBITAL, ACETAMINOPHEN, AND CAFFEINE 1 TABLET: 50; 325; 40 TABLET ORAL at 09:10

## 2022-04-30 NOTE — ED PROVIDER NOTES
This is a 26-year-old female who presents ambulatory to the emergency room with complaints of a headache. Patient states through an  she provided that she has had a headache for approximately a week, worsening over the last 2 to 3 days despite the use of Advil. Patient comes to the emergency room due to continued, almost worsening pain this morning specifically in the back of her head and the right side of her head. Patient denies any chest pain, shortness of breath, dizziness. No nausea or vomiting, no fevers or chills. No visual changes. No ear pain or discharge. Nothing improves her symptoms including the use of Advil. Nothing worsens her symptoms. Has a history of headaches in the past but has never seen neurology or ophthalmology. Unknown chance of pregnancy. Denies any weakness in her extremities. Vaccinated for Covid 19. There are no further complaints at this time. VAUGHN Kinney  History reviewed. No pertinent past medical history. History reviewed. No pertinent surgical history. History reviewed. No pertinent past medical history. History reviewed. No pertinent surgical history. History reviewed. No pertinent family history.     Social History     Socioeconomic History    Marital status:      Spouse name: Not on file    Number of children: Not on file    Years of education: Not on file    Highest education level: Not on file   Occupational History    Not on file   Tobacco Use    Smoking status: Never Smoker    Smokeless tobacco: Never Used   Vaping Use    Vaping Use: Never used   Substance and Sexual Activity    Alcohol use: Never    Drug use: Never    Sexual activity: Yes     Partners: Male     Birth control/protection: None   Other Topics Concern    Not on file   Social History Narrative    Not on file     Social Determinants of Health     Financial Resource Strain:     Difficulty of Paying Living Expenses: Not on file   Food Insecurity:     Worried About Running Out of Food in the Last Year: Not on file    Terrence of Food in the Last Year: Not on file   Transportation Needs:     Lack of Transportation (Medical): Not on file    Lack of Transportation (Non-Medical): Not on file   Physical Activity:     Days of Exercise per Week: Not on file    Minutes of Exercise per Session: Not on file   Stress:     Feeling of Stress : Not on file   Social Connections:     Frequency of Communication with Friends and Family: Not on file    Frequency of Social Gatherings with Friends and Family: Not on file    Attends [employer] Services: Not on file    Active Member of [address] [place] [address] [employer] or Organizations: Not on file    Attends Club or Organization Meetings: Not on file    Marital Status: Not on file   Intimate Partner Violence:     Fear of Current or Ex-Partner: Not on file    Emotionally Abused: Not on file    Physically Abused: Not on file    Sexually Abused: Not on file   Housing Stability:     Unable to Pay for Housing in the Last Year: Not on file    Number of [place] in the Last Year: Not on file    Unstable Housing in the Last Year: Not on file         ALLERGIES: Patient has no known allergies. Review of Systems   Constitutional: Negative for appetite change, chills, diaphoresis, fatigue and fever. HENT: Negative for congestion, ear discharge, ear pain, sinus pressure, sinus pain, sore throat and trouble swallowing. Eyes: Negative for photophobia, pain, redness and visual disturbance. Respiratory: Negative for chest tightness, shortness of breath and wheezing. Cardiovascular: Negative for chest pain and palpitations. Gastrointestinal: Negative for abdominal distention, abdominal pain, nausea and vomiting. Endocrine: Negative. Genitourinary: Negative for difficulty urinating, flank pain, frequency and urgency. Musculoskeletal: Negative for back pain, neck pain and neck stiffness.    Skin: Negative for color change, pallor, rash and wound. Allergic/Immunologic: Negative. Neurological: Positive for headaches. Negative for dizziness, speech difficulty and weakness. Hematological: Does not bruise/bleed easily. Psychiatric/Behavioral: Negative for behavioral problems. The patient is not nervous/anxious. There were no vitals filed for this visit. Physical Exam  Vitals and nursing note reviewed. Constitutional:       General: She is not in acute distress. Appearance: Normal appearance. She is well-developed. She is not ill-appearing. HENT:      Head: Normocephalic and atraumatic. Right Ear: External ear normal.      Left Ear: External ear normal.      Nose: Nose normal. No congestion. Mouth/Throat:      Mouth: Mucous membranes are moist.   Eyes:      General:         Right eye: No discharge. Left eye: No discharge. Conjunctiva/sclera: Conjunctivae normal.      Pupils: Pupils are equal, round, and reactive to light. Neck:      Vascular: No JVD. Trachea: No tracheal deviation. Comments: Full range of motion with no neurological deficits. Cardiovascular:      Rate and Rhythm: Normal rate and regular rhythm. Pulses: Normal pulses. Heart sounds: Normal heart sounds. No murmur heard. No gallop. Pulmonary:      Effort: Pulmonary effort is normal. No respiratory distress. Breath sounds: Normal breath sounds. No wheezing or rales. Chest:      Chest wall: No tenderness. Abdominal:      General: There is no distension. Tenderness: There is no guarding. Genitourinary:     Comments: Deferred  Musculoskeletal:         General: No tenderness. Normal range of motion. Cervical back: Normal range of motion and neck supple. No tenderness. Skin:     General: Skin is warm and dry. Capillary Refill: Capillary refill takes less than 2 seconds. Coloration: Skin is not pale. Findings: No erythema or rash.    Neurological:      General: No focal deficit present. Mental Status: She is alert and oriented to person, place, and time. Motor: No weakness. Coordination: Coordination normal.   Psychiatric:         Mood and Affect: Mood normal.         Behavior: Behavior normal.         Thought Content: Thought content normal.         Judgment: Judgment normal.          MDM  Number of Diagnoses or Management Options  Acute nonintractable headache, unspecified headache type: new and requires workup  Acute sinusitis, recurrence not specified, unspecified location: new and requires workup  Diagnosis management comments: Differential diagnosis includes sinus headache, migraine, tumor and others. After physical examination and review of imaging, as well as Fioricet, patient was discharged home and will follow up with PCP and neurology as an outpatient. Also follow-up with ophthalmology as an outpatient. Return to the emergency room with worsening symptoms. Patient in agreement with plan of care. Amount and/or Complexity of Data Reviewed  Clinical lab tests: ordered and reviewed  Tests in the radiology section of CPT®: ordered and reviewed         Labs Reviewed   URINE CULTURE HOLD SAMPLE   URINALYSIS W/MICROSCOPIC   HCG URINE, QL. - POC     CT HEAD WO CONT    Result Date: 4/30/2022  No evidence of acute intracranial process. 10:25 AM  Pt has been reexamined. Pt has no new complaints, changes or physical findings. Care plan outlined and precautions discussed. All available results were reviewed with pt. All medications were reviewed with pt. All of pt's questions and concerns were addressed. Pt agrees to F/U as instructed and agrees to return to ED upon further deterioration. Pt is ready to go home. Wilda Rodríguez NP    Please note that this dictation was completed with [employer], the [employer] voice recognition software.   Quite often unanticipated grammatical, syntax, homophones, and other interpretive errors are inadvertently transcribed by the computer software. Please disregard these errors. Please excuse any errors that have escaped final proofreading. Thank you.     Procedures

## 2022-04-30 NOTE — ED TRIAGE NOTES
Pt ambulatory to ED accompanied by friend with c/o neck pain radiating up into back of head and right temporal area onset 5-7 days ago.

## 2022-04-30 NOTE — Clinical Note
[address]  [address] [facility] [address]  [phone]    Work/School Note    Date: 4/30/2022    To Whom It May concern:      Maxine Pacheco was seen and treated today in the emergency room by the following provider(s):  Attending Provider: Linda Crespo DO  Nurse Practitioner: Virgil Dawson NP. Maxine Pacheco is excused from work/school on 04/30/22. She is clear to return to work/school on 05/01/22.         Sincerely,          Rachelle Jones NP

## 2022-05-24 ENCOUNTER — ANESTHESIA (OUTPATIENT)
Dept: ENDOSCOPY | Age: 27
End: 2022-05-24

## 2022-05-24 ENCOUNTER — ANESTHESIA EVENT (OUTPATIENT)
Dept: ENDOSCOPY | Age: 27
End: 2022-05-24

## 2022-05-24 ENCOUNTER — HOSPITAL ENCOUNTER (OUTPATIENT)
Age: 27
Setting detail: OUTPATIENT SURGERY
Discharge: HOME | End: 2022-05-24
Attending: INTERNAL MEDICINE | Admitting: INTERNAL MEDICINE

## 2022-05-24 VITALS
OXYGEN SATURATION: 100 % | BODY MASS INDEX: 26.75 KG/M2 | HEIGHT: 63 IN | WEIGHT: 151 LBS | HEART RATE: 77 BPM | SYSTOLIC BLOOD PRESSURE: 105 MMHG | TEMPERATURE: 98 F | RESPIRATION RATE: 18 BRPM | DIASTOLIC BLOOD PRESSURE: 81 MMHG

## 2022-05-24 LAB — HCG UR QL: NEGATIVE

## 2022-05-24 PROCEDURE — 74011250636 HC RX REV CODE- 250/636: Performed by: INTERNAL MEDICINE

## 2022-05-24 PROCEDURE — 74011250636 HC RX REV CODE- 250/636: Performed by: ANESTHESIOLOGY

## 2022-05-24 PROCEDURE — 74011000250 HC RX REV CODE- 250: Performed by: ANESTHESIOLOGY

## 2022-05-24 PROCEDURE — 76040000019: Performed by: INTERNAL MEDICINE

## 2022-05-24 PROCEDURE — 2709999900 HC NON-CHARGEABLE SUPPLY: Performed by: INTERNAL MEDICINE

## 2022-05-24 PROCEDURE — 76060000031 HC ANESTHESIA FIRST 0.5 HR: Performed by: INTERNAL MEDICINE

## 2022-05-24 PROCEDURE — 81025 URINE PREGNANCY TEST: CPT

## 2022-05-24 RX ORDER — LIDOCAINE HYDROCHLORIDE 20 MG/ML
INJECTION, SOLUTION EPIDURAL; INFILTRATION; INTRACAUDAL; PERINEURAL AS NEEDED
Status: DISCONTINUED | OUTPATIENT
Start: 2022-05-24 | End: 2022-05-24 | Stop reason: HOSPADM

## 2022-05-24 RX ORDER — SODIUM CHLORIDE 9 MG/ML
25 INJECTION, SOLUTION INTRAVENOUS CONTINUOUS
Status: DISCONTINUED | OUTPATIENT
Start: 2022-05-24 | End: 2022-05-24 | Stop reason: HOSPADM

## 2022-05-24 RX ORDER — PROPOFOL 10 MG/ML
INJECTION, EMULSION INTRAVENOUS AS NEEDED
Status: DISCONTINUED | OUTPATIENT
Start: 2022-05-24 | End: 2022-05-24 | Stop reason: HOSPADM

## 2022-05-24 RX ADMIN — PROPOFOL 220 MG: 10 INJECTION, EMULSION INTRAVENOUS at 08:28

## 2022-05-24 RX ADMIN — SODIUM CHLORIDE 25 ML/HR: 9 INJECTION, SOLUTION INTRAVENOUS at 07:44

## 2022-05-24 RX ADMIN — LIDOCAINE HYDROCHLORIDE 40 MG: 20 INJECTION, SOLUTION EPIDURAL; INFILTRATION; INTRACAUDAL; PERINEURAL at 08:15

## 2022-05-24 NOTE — PROGRESS NOTES
828Endoscope was pre-cleaned at bedside immediately following procedure by Geovanny Frazier endo tech. [id number]  Received report from anesthesia. Assumed pt care. VSS.

## 2022-05-24 NOTE — ROUTINE PROCESS
Maxine Matson  1995  [id number]    Situation:  Verbal report received from: Angelo Redmond RN  Procedure: Procedure(s):  COLONOSCOPY    Background:    Preoperative diagnosis: hematemesis  Postoperative diagnosis: hemorrhoids    :  Dr. Siva Ding  Assistant(s): Endoscopy Technician-1: Charmayne Dallas  Endoscopy RN-1: Taiwo Edwards RN    Specimens: * No specimens in log *  H. Pylori  no    Assessment:  Intra-procedure medications     Anesthesia gave intra-procedure sedation and medications, see anesthesia flow sheet yes    Intravenous fluids: NS@ KVO     Vital signs stable     Abdominal assessment: round and soft     Recommendation:  Discharge patient per MD order  Family  Permission to share finding with family or friend yes

## 2022-05-24 NOTE — DISCHARGE INSTRUCTIONS
Maxinejeffrey Mann  [id number]  1995    COLON DISCHARGE INSTRUCTIONS  Discomfort:  Redness at IV site- apply warm compress to area; if redness or soreness persist- contact your physician  There may be a slight amount of blood passed from the rectum  Gaseous discomfort- walking, belching will help relieve any discomfort  You may not operate a vehicle for 12 hours  You may not engage in an occupation involving machinery or appliances for rest of today  You may not drink alcoholic beverages for at least 12 hours  Avoid making any critical decisions for at least 24 hour  DIET:   High fiber diet. - however -  remember your colon is empty and a heavy meal will produce gas. Avoid these foods:  vegetables, fried / greasy foods, carbonated drinks for today  MEDICATION:         ACTIVITY:  You may not resume your normal daily activities until tomorrow AM; it is recommended that you spend the remainder of the day resting -  avoid any strenuous activity. CALL M.D.   ANY SIGN OF:   Increasing pain, nausea, vomiting  Abdominal distension (swelling)  New increased bleeding (oral or rectal)  Fever (chills)  Pain in chest area  Bloody discharge from nose or mouth  Shortness of breath    IMPRESSION:  -Normal terminal ileum  -Diminutive grade 1 internal hemorrhoids  -Anterior anal sentinel tag  -No masses, polyps, or inflammation noted    Follow-up Instructions:   Call Dr. Chanelle Gupta if questions arise regarding your procedure  Telephone # [phone]  Repeat colonoscopy at age 38yrs old years    John Loving MD

## 2022-05-24 NOTE — ANESTHESIA PREPROCEDURE EVALUATION
Relevant Problems   No relevant active problems       Anesthetic History   No history of anesthetic complications            Review of Systems / Medical History  Patient summary reviewed, nursing notes reviewed and pertinent labs reviewed    Pulmonary  Within defined limits                 Neuro/Psych         Headaches     Cardiovascular                  Exercise tolerance: >4 METS  Comments: Active, denies hrt probs or chest pain   GI/Hepatic/Renal               Comments: Rectal bleeding Endo/Other  Within defined limits           Other Findings              Physical Exam    Airway  Mallampati: II  TM Distance: 4 - 6 cm  Neck ROM: normal range of motion   Mouth opening: Normal     Cardiovascular  Regular rate and rhythm,  S1 and S2 normal,  no murmur, click, rub, or gallop             Dental  No notable dental hx       Pulmonary  Breath sounds clear to auscultation               Abdominal  GI exam deferred       Other Findings            Anesthetic Plan    ASA: 2  Anesthesia type: total IV anesthesia          Induction: Intravenous  Anesthetic plan and risks discussed with: Patient      UPT NEG preop

## 2022-05-24 NOTE — PROCEDURES
NAME:  Lenard Parr   :   1995   MRN:   [id number]     Date/Time:  2022 8:36 AM    Colonoscopy Operative Report    Procedure Type:   Colonoscopy --diagnostic     Indications:     Rectal bleeding and lower abdominal pain  Pre-operative Diagnosis: see indication above  Post-operative Diagnosis:  See findings below    Primary Gastroenterologist:  Lennox Ort, MD, MD  :  Kurtis Jansen MD  Referring Provider: -VAUGHN Kinney    Exam:  Airway: clear, no airway problems anticipated  Heart: RRR, without gallops or rubs  Lungs: clear bilaterally without wheezes, crackles, or rhonchi  Abdomen: soft, nontender, nondistended, bowel sounds present  Mental Status: awake, alert and oriented to person, place and time    Sedation:  MAC anesthesia Propofol 240mg IV  Procedure Details:  After informed consent was obtained with all risks and benefits of procedure explained and preoperative exam completed, the patient was taken to the endoscopy suite and placed in the left lateral decubitus position. Upon sequential sedation as per above, a digital rectal exam was performed demonstrating internal hemorrhoids with anterior sentinel tag. The Olympus videocolonoscope  was inserted in the rectum and carefully advanced to the cecum, which was identified by the ileocecal valve and appendiceal orifice. The distal terminal ileum was evaluated. The quality of preparation was adequate. The colonoscope was slowly withdrawn with careful evaluation between folds. Retroflexion in the rectum was completed demonstrating small internal hemorrhoids. Findings:     -Normal terminal ileum  -Diminutive grade 1 internal hemorrhoids  -Anterior anal sentinel tag  -No masses, polyps, or inflammation noted    Specimen Removed:  None. Complications: None. EBL:  None.     Impression:    -Normal terminal ileum  -Diminutive grade 1 internal hemorrhoids  -Anterior anal sentinel tag  -No masses, polyps, or inflammation noted    Recommendations: --Repeat colonoscopy at age 39years old. High fiber diet. Resume normal medication(s). Discharge Disposition:  Home in the company of a  when able to ambulate.     Sandra Owens MD

## 2022-05-24 NOTE — H&P
Gastroenterology Outpatient History and Physical    Patient: Sabas Leroy    Physician: Macho Menjivar MD    Chief Complaint: hematochezia  History of Present Illness: 27yo F with hematochezia described as BRB associated with lower abd painas cramping. History:  History reviewed. No pertinent past medical history. History reviewed. No pertinent surgical history. Social History     Socioeconomic History    Marital status:    Tobacco Use    Smoking status: Never Smoker    Smokeless tobacco: Never Used   Vaping Use    Vaping Use: Never used   Substance and Sexual Activity    Alcohol use: Never    Drug use: Never    Sexual activity: Yes     Partners: Male     Birth control/protection: None    History reviewed. No pertinent family history. There is no problem list on file for this patient. Allergies: No Known Allergies  Medications:   Prior to Admission medications    Medication Sig Start Date End Date Taking? Authorizing Provider   prenatal vit-calcium-iron-fa (Prenatal Plus) 29 mg iron- 1 mg tab tablet Take 1 Tablet by mouth daily. Patient not taking: Reported on 5/24/2022 9/29/21   Magdalene Lozano PA   acetaminophen-caffeine 500-65 mg (Excedrin Tension Headache) 500-65 mg tab Take 1 Tablet by mouth every six (6) hours as needed for Headache. Patient not taking: Reported on 12/2/2021 9/29/21   Magdalene Lozano PA   ibuprofen (MOTRIN) 600 mg tablet Take 1 Tab by mouth every six (6) hours as needed for Pain. Patient not taking: Reported on 9/29/2021 6/1/19   Magdalene Lozano PA     Physical Exam:   Vital Signs: Blood pressure 117/76, pulse (!) 19, temperature 97.8 °F (36.6 °C), resp. rate 20, height 5' 3\" (1.6 m), weight 68.5 kg (151 lb), SpO2 100 %, not currently breastfeeding.   General: well developed, well nourished   HEENT: unremarkable   Heart: regular rhythm no mumur    Lungs: clear   Abdominal:  benign   Neurological: unremarkable   Extremities: no edema Findings/Diagnosis: Hematochezia  Plan of Care/Planned Procedure: colonoscopy with conscious/deep sedation    Signed:  Sandra Ding MD 5/24/2022

## 2022-05-24 NOTE — ANESTHESIA POSTPROCEDURE EVALUATION
Procedure(s):  COLONOSCOPY.    total IV anesthesia    Anesthesia Post Evaluation        Patient location during evaluation: PACU  Note status: Adequate. Level of consciousness: responsive to verbal stimuli and sleepy but conscious  Pain management: satisfactory to patient  Airway patency: patent  Anesthetic complications: no  Cardiovascular status: acceptable  Respiratory status: acceptable  Hydration status: acceptable  Comments: +Post-Anesthesia Evaluation and Assessment    Patient: Jose D Arthur MRN: [id number]  SSN: [id number]   YOB: 1995  Age: 32 y.o. Sex: female      Cardiovascular Function/Vital Signs    /81   Pulse 77   Temp 36.7 °C (98 °F)   Resp 18   Ht 5' 3\" (1.6 m)   Wt 68.5 kg (151 lb)   SpO2 100%   Breastfeeding No   BMI 26.75 kg/m²     Patient is status post Procedure(s):  COLONOSCOPY. Nausea/Vomiting: Controlled. Postoperative hydration reviewed and adequate. Pain:  Pain Scale 1: Numeric (0 - 10) (05/24/22 0854)  Pain Intensity 1: 0 (05/24/22 0854)   Managed. Neurological Status: At baseline. Mental Status and Level of Consciousness: Arousable. Pulmonary Status:   O2 Device: None (Room air) (05/24/22 0854)   Adequate oxygenation and airway patent. Complications related to anesthesia: None    Post-anesthesia assessment completed. No concerns. Signed By: Melchor Woodall DO    5/24/2022  Post anesthesia nausea and vomiting:  controlled      INITIAL Post-op Vital signs:   Vitals Value Taken Time   /81 05/24/22 0854   Temp 36.7 °C (98 °F) 05/24/22 0839   Pulse 67 05/24/22 0854   Resp 14 05/24/22 0854   SpO2 100 % 05/24/22 0854   Vitals shown include unvalidated device data.

## 2022-08-02 ENCOUNTER — HOSPITAL ENCOUNTER (OUTPATIENT)
Dept: LAB | Age: 27
Discharge: HOME | End: 2022-08-02

## 2022-08-02 ENCOUNTER — OFFICE VISIT (OUTPATIENT)
Dept: FAMILY MEDICINE CLINIC | Age: 27
End: 2022-08-02

## 2022-08-02 VITALS
WEIGHT: 155 LBS | HEART RATE: 71 BPM | DIASTOLIC BLOOD PRESSURE: 72 MMHG | SYSTOLIC BLOOD PRESSURE: 119 MMHG | TEMPERATURE: 98.2 F | BODY MASS INDEX: 29.27 KG/M2 | OXYGEN SATURATION: 100 % | HEIGHT: 61 IN

## 2022-08-02 DIAGNOSIS — Z12.4 ENCOUNTER FOR REPEAT PAPANICOLAOU SMEAR OF CERVIX: Primary | ICD-10-CM

## 2022-08-02 PROCEDURE — 99213 OFFICE O/P EST LOW 20 MIN: CPT | Performed by: PHYSICIAN ASSISTANT

## 2022-08-02 PROCEDURE — 88142 CYTOPATH C/V THIN LAYER: CPT

## 2022-08-02 NOTE — PROGRESS NOTES
Assessment/Plan:    Diagnoses and all orders for this visit:    1. Encounter for repeat Papanicolaou smear of cervix  -     PAP, LIQUID BASED, MANUAL SCREEN; Future        Follow-up and Dispositions    Return in about 1 year (around 2023). YOVANY Kirkland expressed understanding of this plan. An AVS was printed and given to the patient.      ----------------------------------------------------------------------    Chief Complaint   Patient presents with    Well Woman     Repeat PAP smear       History of Present Illness:    31 yo   presents for repeat pap smear due to heavy inflammatory exudate on last pap  She has no changes in her gyn health since her last visit about 10 months ago  She is having regular periods each month  She is in a safe relationship, no risk of DV  She has been seen by GI since I last saw her and was dx'd with internal hemorrhoids and her rectal bleeding \"is much improved\". She is following the diet recommendations per GI      No past medical history on file. Current Outpatient Medications   Medication Sig Dispense Refill    Sutab 1.479-0.188- 0.225 gram tab TAKE 1 TABLET BY MOUTH AS DIRECTED BEFORE COLONOSCOPY (Patient not taking: Reported on 2022)      prenatal vit-calcium-iron-fa (Prenatal Plus) 29 mg iron- 1 mg tab tablet Take 1 Tablet by mouth daily. (Patient not taking: No sig reported) 90 Tablet 3    acetaminophen-caffeine 500-65 mg (Excedrin Tension Headache) 500-65 mg tab Take 1 Tablet by mouth every six (6) hours as needed for Headache. (Patient not taking: No sig reported) 20 Tablet 1    ibuprofen (MOTRIN) 600 mg tablet Take 1 Tab by mouth every six (6) hours as needed for Pain. (Patient not taking: No sig reported) 60 Tab 0       No Known Allergies    Social History     Tobacco Use    Smoking status: Never    Smokeless tobacco: Never   Vaping Use    Vaping Use: Never used   Substance Use Topics    Alcohol use:  Yes Comment: \"Sometimes\"    Drug use: Never       No family history on file.     Physical Exam:     Visit Vitals  /72 (BP 1 Location: Left upper arm, BP Patient Position: Sitting)   Pulse 71   Temp 98.2 °F (36.8 °C) (Temporal)   Ht 5' 1.42\" (1.56 m)   Wt 155 lb (70.3 kg)   LMP 07/05/2022   SpO2 100%   BMI 28.89 kg/m²       A&Ox3  WDWN NAD  Respirations normal and non labored    Pelvic exam- ext neg for lesion or dishcarge, cervix with moderate physiological appearing discharge that was wiped away to reveal normal appearing cervix  Uterus and adnexal exam neg for mass or tenderness

## 2022-08-02 NOTE — PROGRESS NOTES
[facility]    When was your last pap smear and where? 9/30/2021    Any abnormal results from previous pap smears? YES-Infection    Any problems with your breasts? No    Any family history of breast/ovarian cancer? No    Do you have any kids? Yes- 1    Oldest - 9year-old youngest 0    Are you sexually active? yes    Are you using any type of birth control? If yes where do you go for this? No    Abuse screening completed this visit?  yes

## 2022-08-08 NOTE — PROGRESS NOTES
Please send a letter/contact pt-pt is neg with heavy inflammation so I would like to repeat it in one year.  Thank you

## 2022-08-11 NOTE — PROGRESS NOTES
Tc wit the patient. I gave the patient the PAP smear results per providers following notes: \"Please send a letter/contact pt-pt is neg with heavy inflammation so I would like to repeat it in one year. Thank you. \" Patient verbalized understanding.  Viral Verdin created a letter and sent to the queue recipient for the letter to be printed and mailed to the pt's mailing address on file

## 2023-05-22 RX ORDER — SOD SULF/POT CHLORIDE/MAG SULF 1.479 G
TABLET ORAL
COMMUNITY
Start: 2022-05-20

## 2023-05-22 RX ORDER — IBUPROFEN 600 MG/1
600 TABLET ORAL EVERY 6 HOURS PRN
COMMUNITY
Start: 2019-06-01

## 2023-05-22 RX ORDER — ACETAMINOPHEN/CAFFEINE 500MG-65MG
1 TABLET ORAL EVERY 6 HOURS PRN
COMMUNITY
Start: 2021-09-29

## 2024-05-23 ENCOUNTER — APPOINTMENT (OUTPATIENT)
Facility: HOSPITAL | Age: 29
End: 2024-05-23

## 2024-05-23 ENCOUNTER — TELEPHONE (OUTPATIENT)
Age: 29
End: 2024-05-23

## 2024-05-23 ENCOUNTER — HOSPITAL ENCOUNTER (EMERGENCY)
Facility: HOSPITAL | Age: 29
Discharge: HOME | End: 2024-05-24
Attending: EMERGENCY MEDICINE

## 2024-05-23 DIAGNOSIS — G43.909 MIGRAINE WITHOUT STATUS MIGRAINOSUS, NOT INTRACTABLE, UNSPECIFIED MIGRAINE TYPE: Primary | ICD-10-CM

## 2024-05-23 DIAGNOSIS — I72.0 CAROTID ANEURYSM, LEFT (HCC): ICD-10-CM

## 2024-05-23 LAB
ALBUMIN SERPL-MCNC: 4 G/DL (ref 3.5–5)
ALBUMIN/GLOB SERPL: 1 (ref 1.1–2.2)
ALP SERPL-CCNC: 109 U/L (ref 45–117)
ALT SERPL-CCNC: 22 U/L (ref 12–78)
ANION GAP SERPL CALC-SCNC: 4 MMOL/L (ref 5–15)
AST SERPL-CCNC: 16 U/L (ref 15–37)
BASOPHILS # BLD: 0 K/UL (ref 0–0.1)
BASOPHILS NFR BLD: 0 % (ref 0–1)
BILIRUB SERPL-MCNC: 0.3 MG/DL (ref 0.2–1)
BUN SERPL-MCNC: 9 MG/DL (ref 6–20)
BUN/CREAT SERPL: 15 (ref 12–20)
CALCIUM SERPL-MCNC: 9.3 MG/DL (ref 8.5–10.1)
CHLORIDE SERPL-SCNC: 107 MMOL/L (ref 97–108)
CO2 SERPL-SCNC: 28 MMOL/L (ref 21–32)
COMMENT:: NORMAL
CREAT SERPL-MCNC: 0.62 MG/DL (ref 0.55–1.02)
DIFFERENTIAL METHOD BLD: NORMAL
EOSINOPHIL # BLD: 0.1 K/UL (ref 0–0.4)
EOSINOPHIL NFR BLD: 2 % (ref 0–7)
ERYTHROCYTE [DISTWIDTH] IN BLOOD BY AUTOMATED COUNT: 12.6 % (ref 11.5–14.5)
GLOBULIN SER CALC-MCNC: 4.2 G/DL (ref 2–4)
GLUCOSE SERPL-MCNC: 83 MG/DL (ref 65–100)
HCT VFR BLD AUTO: 41.3 % (ref 35–47)
HGB BLD-MCNC: 13.9 G/DL (ref 11.5–16)
IMM GRANULOCYTES # BLD AUTO: 0 K/UL (ref 0–0.04)
IMM GRANULOCYTES NFR BLD AUTO: 0 % (ref 0–0.5)
LYMPHOCYTES # BLD: 2.5 K/UL (ref 0.8–3.5)
LYMPHOCYTES NFR BLD: 33 % (ref 12–49)
MCH RBC QN AUTO: 27.7 PG (ref 26–34)
MCHC RBC AUTO-ENTMCNC: 33.7 G/DL (ref 30–36.5)
MCV RBC AUTO: 82.3 FL (ref 80–99)
MONOCYTES # BLD: 0.9 K/UL (ref 0–1)
MONOCYTES NFR BLD: 12 % (ref 5–13)
NEUTS SEG # BLD: 4 K/UL (ref 1.8–8)
NEUTS SEG NFR BLD: 53 % (ref 32–75)
NRBC # BLD: 0 K/UL (ref 0–0.01)
NRBC BLD-RTO: 0 PER 100 WBC
PLATELET # BLD AUTO: 324 K/UL (ref 150–400)
PMV BLD AUTO: 9.8 FL (ref 8.9–12.9)
POTASSIUM SERPL-SCNC: 3.5 MMOL/L (ref 3.5–5.1)
PROT SERPL-MCNC: 8.2 G/DL (ref 6.4–8.2)
RBC # BLD AUTO: 5.02 M/UL (ref 3.8–5.2)
SODIUM SERPL-SCNC: 139 MMOL/L (ref 136–145)
SPECIMEN HOLD: NORMAL
WBC # BLD AUTO: 7.5 K/UL (ref 3.6–11)

## 2024-05-23 PROCEDURE — 99285 EMERGENCY DEPT VISIT HI MDM: CPT

## 2024-05-23 PROCEDURE — 80053 COMPREHEN METABOLIC PANEL: CPT

## 2024-05-23 PROCEDURE — 36415 COLL VENOUS BLD VENIPUNCTURE: CPT

## 2024-05-23 PROCEDURE — 70450 CT HEAD/BRAIN W/O DYE: CPT

## 2024-05-23 PROCEDURE — 2580000003 HC RX 258: Performed by: STUDENT IN AN ORGANIZED HEALTH CARE EDUCATION/TRAINING PROGRAM

## 2024-05-23 PROCEDURE — 85025 COMPLETE CBC W/AUTO DIFF WBC: CPT

## 2024-05-23 PROCEDURE — 96374 THER/PROPH/DIAG INJ IV PUSH: CPT

## 2024-05-23 PROCEDURE — 96375 TX/PRO/DX INJ NEW DRUG ADDON: CPT

## 2024-05-23 PROCEDURE — 6360000002 HC RX W HCPCS: Performed by: STUDENT IN AN ORGANIZED HEALTH CARE EDUCATION/TRAINING PROGRAM

## 2024-05-23 PROCEDURE — 70498 CT ANGIOGRAPHY NECK: CPT

## 2024-05-23 PROCEDURE — 6360000004 HC RX CONTRAST MEDICATION: Performed by: STUDENT IN AN ORGANIZED HEALTH CARE EDUCATION/TRAINING PROGRAM

## 2024-05-23 RX ORDER — METOCLOPRAMIDE HYDROCHLORIDE 5 MG/ML
10 INJECTION INTRAMUSCULAR; INTRAVENOUS ONCE
Status: COMPLETED | OUTPATIENT
Start: 2024-05-23 | End: 2024-05-23

## 2024-05-23 RX ORDER — DEXAMETHASONE SODIUM PHOSPHATE 4 MG/ML
4 INJECTION, SOLUTION INTRA-ARTICULAR; INTRALESIONAL; INTRAMUSCULAR; INTRAVENOUS; SOFT TISSUE ONCE
Status: COMPLETED | OUTPATIENT
Start: 2024-05-23 | End: 2024-05-23

## 2024-05-23 RX ORDER — 0.9 % SODIUM CHLORIDE 0.9 %
1000 INTRAVENOUS SOLUTION INTRAVENOUS ONCE
Status: COMPLETED | OUTPATIENT
Start: 2024-05-23 | End: 2024-05-24

## 2024-05-23 RX ORDER — KETOROLAC TROMETHAMINE 30 MG/ML
15 INJECTION, SOLUTION INTRAMUSCULAR; INTRAVENOUS
Status: COMPLETED | OUTPATIENT
Start: 2024-05-23 | End: 2024-05-23

## 2024-05-23 RX ADMIN — METOCLOPRAMIDE 10 MG: 5 INJECTION, SOLUTION INTRAMUSCULAR; INTRAVENOUS at 23:33

## 2024-05-23 RX ADMIN — DEXAMETHASONE SODIUM PHOSPHATE 4 MG: 4 INJECTION, SOLUTION INTRAMUSCULAR; INTRAVENOUS at 23:52

## 2024-05-23 RX ADMIN — KETOROLAC TROMETHAMINE 15 MG: 30 INJECTION, SOLUTION INTRAMUSCULAR at 23:49

## 2024-05-23 RX ADMIN — IOPAMIDOL 100 ML: 755 INJECTION, SOLUTION INTRAVENOUS at 20:56

## 2024-05-23 RX ADMIN — SODIUM CHLORIDE 1000 ML: 9 INJECTION, SOLUTION INTRAVENOUS at 23:49

## 2024-05-23 ASSESSMENT — PAIN DESCRIPTION - FREQUENCY
FREQUENCY: CONTINUOUS
FREQUENCY: CONTINUOUS

## 2024-05-23 ASSESSMENT — PAIN DESCRIPTION - ONSET
ONSET: PROGRESSIVE
ONSET: ON-GOING

## 2024-05-23 ASSESSMENT — PAIN - FUNCTIONAL ASSESSMENT
PAIN_FUNCTIONAL_ASSESSMENT: ACTIVITIES ARE NOT PREVENTED
PAIN_FUNCTIONAL_ASSESSMENT: ACTIVITIES ARE NOT PREVENTED
PAIN_FUNCTIONAL_ASSESSMENT: 0-10

## 2024-05-23 ASSESSMENT — PAIN DESCRIPTION - LOCATION
LOCATION: HEAD
LOCATION: HEAD

## 2024-05-23 ASSESSMENT — PAIN SCALES - GENERAL
PAINLEVEL_OUTOF10: 5
PAINLEVEL_OUTOF10: 5

## 2024-05-23 ASSESSMENT — PAIN DESCRIPTION - PAIN TYPE
TYPE: ACUTE PAIN
TYPE: ACUTE PAIN

## 2024-05-23 ASSESSMENT — PAIN DESCRIPTION - DESCRIPTORS
DESCRIPTORS: DISCOMFORT
DESCRIPTORS: ACHING;DISCOMFORT

## 2024-05-23 ASSESSMENT — PAIN DESCRIPTION - ORIENTATION
ORIENTATION: ANTERIOR
ORIENTATION: MID

## 2024-05-23 NOTE — TELEPHONE ENCOUNTER
Tc to the pt to triage. No answer. I left a message to call me back, with my number. Isabella Cain RN

## 2024-05-23 NOTE — TELEPHONE ENCOUNTER
Patient called the front office on 5/23/2024.  Pt verbalized not feeling well and feeling  her a face \"a little numb\". Patient ask for a same day appointment but there was no more open slots.  Patient was told about our morning line an advised to go to an Urgent Care in case of worsening of symptoms.

## 2024-05-23 NOTE — ED TRIAGE NOTES
Pt comes to ED with reports of headache and \"part of my face is asleep\" since Tuesday. Numbness reported as being intermittent and more constant since Thursday.    Triage completed using [employer] services for [place]. Session Code = 38290.    JEAN Quintana assessing pt in triage.

## 2024-05-23 NOTE — TELEPHONE ENCOUNTER
The pt returned the call. The pt verified her name and . She started having numbness on the right side of her face, on Tuesday this week. Tingling sensation. Denied pain. Side of her face sometimes gets really hot on one side. Denied swelling. Stated it has a little bit red, but not much, just very hot. She has not had this before, has no hx of chronic illness. NKA. No rash. No drooping face, no slurred speech, has had a bad H/A on Tues, has a little H/A now, ( the pt has had a hx of H/A), no dizziness, or blurred vision. She has no chest pain or SOB at this time. The pt was advised to go to the Urgent care or ED if she feels like she is having an emergency, and needs to be seen today, or can not wait til Tues for the the [facility] same day appt line to call. The pt was advised that she should call our appt line if she would like to have a same day appt.  The same day appt line is open on 24. I informed the pt it is best to call right at 7am. I texted the pt a picture of the [facility] calendar in [place]ian. Isabella Cain RN

## 2024-05-24 ENCOUNTER — TELEPHONE (OUTPATIENT)
Age: 29
End: 2024-05-24

## 2024-05-24 ENCOUNTER — APPOINTMENT (OUTPATIENT)
Facility: HOSPITAL | Age: 29
End: 2024-05-24

## 2024-05-24 VITALS
RESPIRATION RATE: 16 BRPM | TEMPERATURE: 98.5 F | WEIGHT: 180.78 LBS | HEIGHT: 64 IN | SYSTOLIC BLOOD PRESSURE: 121 MMHG | HEART RATE: 88 BPM | BODY MASS INDEX: 30.86 KG/M2 | OXYGEN SATURATION: 99 % | DIASTOLIC BLOOD PRESSURE: 87 MMHG

## 2024-05-24 PROCEDURE — 6360000002 HC RX W HCPCS: Performed by: STUDENT IN AN ORGANIZED HEALTH CARE EDUCATION/TRAINING PROGRAM

## 2024-05-24 PROCEDURE — 96375 TX/PRO/DX INJ NEW DRUG ADDON: CPT

## 2024-05-24 PROCEDURE — 2580000003 HC RX 258: Performed by: STUDENT IN AN ORGANIZED HEALTH CARE EDUCATION/TRAINING PROGRAM

## 2024-05-24 PROCEDURE — 70553 MRI BRAIN STEM W/O & W/DYE: CPT

## 2024-05-24 PROCEDURE — A9579 GAD-BASE MR CONTRAST NOS,1ML: HCPCS | Performed by: STUDENT IN AN ORGANIZED HEALTH CARE EDUCATION/TRAINING PROGRAM

## 2024-05-24 PROCEDURE — 6360000004 HC RX CONTRAST MEDICATION: Performed by: STUDENT IN AN ORGANIZED HEALTH CARE EDUCATION/TRAINING PROGRAM

## 2024-05-24 RX ORDER — SODIUM CHLORIDE 0.9 % (FLUSH) 0.9 %
5-40 SYRINGE (ML) INJECTION 2 TIMES DAILY
Status: DISCONTINUED | OUTPATIENT
Start: 2024-05-24 | End: 2024-05-24 | Stop reason: HOSPADM

## 2024-05-24 RX ORDER — KETOROLAC TROMETHAMINE 10 MG/1
10 TABLET, FILM COATED ORAL EVERY 6 HOURS PRN
Qty: 16 TABLET | Refills: 0 | Status: SHIPPED | OUTPATIENT
Start: 2024-05-24

## 2024-05-24 RX ORDER — LORAZEPAM 2 MG/ML
1 INJECTION INTRAMUSCULAR ONCE
Status: COMPLETED | OUTPATIENT
Start: 2024-05-24 | End: 2024-05-24

## 2024-05-24 RX ADMIN — LORAZEPAM 1 MG: 2 INJECTION INTRAMUSCULAR; INTRAVENOUS at 00:45

## 2024-05-24 RX ADMIN — GADOTERIDOL 15 ML: 279.3 INJECTION, SOLUTION INTRAVENOUS at 01:10

## 2024-05-24 RX ADMIN — Medication 10 ML: at 00:06

## 2024-05-24 ASSESSMENT — PAIN SCALES - GENERAL: PAINLEVEL_OUTOF10: 0

## 2024-05-24 NOTE — TELEPHONE ENCOUNTER
Patient called the office requesting an ED follow up appointment.   Note routed to Nurse Navigator.   
no

## 2024-05-24 NOTE — ED NOTES
Received call from MRI stating patient is very anxious d/t claustrophobia, request for anxiety medication to continue MRI. Keyon HANDLEY notified, verbal order for 1mg IV ativan. IV Ativan administered prior to preceding with MRI,  used

## 2024-05-24 NOTE — ED NOTES
Patient discharged by provider, discharge instructions provided to patient and reviewed, all questions answered,  used. Vital signs stable, A/Ox4, breathing unlabored. Patient ambulatory on discharge

## 2024-05-24 NOTE — ED PROVIDER NOTES
film images such as CT, Ultrasound and MRI are read by the radiologist.     MRI BRAIN W WO CONTRAST         CT HEAD WO CONTRAST   Final Result      1. No acute intracranial abnormality.   2. Bilateral maxillary sinusitis.            CTA HEAD NECK W CONTRAST   Final Result   1. No acute vascular abnormality, no large vessel occlusion. No hemodynamically   significant stenosis.   2. 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   3. Mucosal disease of the bilateral maxillary sinuses..              LABS:  Labs Reviewed   COMPREHENSIVE METABOLIC PANEL - Abnormal; Notable for the following components:       Result Value    Anion Gap 4 (*)     Globulin 4.2 (*)     Albumin/Globulin Ratio 1.0 (*)     All other components within normal limits   CBC WITH AUTO DIFFERENTIAL   EXTRA TUBES HOLD       All other labs were within normal range or not returned as of this dictation.    EMERGENCY DEPARTMENT COURSE and DIFFERENTIAL DIAGNOSIS/MDM:   Vitals:    Vitals:    05/23/24 2130 05/23/24 2200 05/23/24 2230 05/24/24 0000   BP:    107/84   Pulse:       Resp:       Temp:       TempSrc:       SpO2: 98% 98% 97% 96%   Weight:       Height:           Medical Decision Making  Amount and/or Complexity of Data Reviewed  Labs: ordered.  Radiology: ordered.    Risk  Prescription drug management.    29-year-old female presents today with complaints of headache for the last 2 days and right-sided facial paresthesias and numbness.  Neurological exam is nonfocal, do note some horizontal nystagmus but otherwise unremarkable.  No facial droop.  Considered differentials including acute neurological etiology.  CT head and CTA head and neck ordered which showed no acute process, did show 4 mm left-sided carotid aneurysm.  Ordered MRI head with and without which resulted and showed no acute infarct, intracranial hemorrhage, intracranial mass.  Recommend patient follow-up with neurointerventional with

## 2024-05-28 ENCOUNTER — TELEPHONE (OUTPATIENT)
Age: 29
End: 2024-05-28

## 2024-05-28 NOTE — TELEPHONE ENCOUNTER
T/C made to the patient with assistance from [facility]  #[id number], session code #74186, to follow-up after her 24 [facility] ED visit for c/o: HA, intermittent facial numbness  Dx: Migraine without status migrainosus, L Carotid aneurysm    CT Head:  1. No acute intracranial abnormality.  2. Bilateral maxillary sinusitis.    CTA Head/Neck:  1. No acute vascular abnormality, no large vessel occlusion. No hemodynamically  significant stenosis.  2. 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  3. Mucosal disease of the bilateral maxillary sinuses..    MRI Brain:  Left greater than right maxillary sinus disease.  Normal MRI evaluation of the skull base.  No acute intracranial process. Normal MRI of the brain.  No intracranial mass, hemorrhage or evidence of acute infarction.    Labs: abnormal results noted in the CMP    Follow-up with Neurology and [facility] recommended.      Per the patient, she still has a headache which is worse in bright light, periods of nausea and some numbness, but not drooping, on the R side of her face. She stated that she is able to smile, chew and swallow without difficulty and denied having any vomiting, difficulty breathing or changes in her vision or hearing. Patient also stated that since the ED visit, she feels that her feet are \"warm on the inside like an inflammation.\" She denied fever, recent injury and any external changes to her feet and stated that she is able to move them as she usually would and is able to walk. Per the patient, she does not smoke or drink alcohol and the Ketorolac prescribed by the ED is the only medication she is taking.     Per the patient, she has a new patient appointment scheduled with [facility] Neurology on 2024 which was the soonest available appointment. She would like help from the [employer] to schedule the recommended appointment with [facility].    Her enrollment in the [facility] referral program

## 2024-05-30 ENCOUNTER — TELEPHONE (OUTPATIENT)
Age: 29
End: 2024-05-30

## 2024-06-06 ENCOUNTER — TELEPHONE (OUTPATIENT)
Age: 29
End: 2024-06-06

## 2024-06-06 NOTE — TELEPHONE ENCOUNTER
CHW called patient 2x to assist with medical bills and was unable to speak with patient. Left a voice message explaining the reason of the call and asking to call CHW back.

## 2024-06-22 ENCOUNTER — APPOINTMENT (OUTPATIENT)
Facility: HOSPITAL | Age: 29
End: 2024-06-22
Payer: MEDICAID

## 2024-06-22 ENCOUNTER — HOSPITAL ENCOUNTER (EMERGENCY)
Facility: HOSPITAL | Age: 29
Discharge: HOME | End: 2024-06-22
Attending: EMERGENCY MEDICINE
Payer: MEDICAID

## 2024-06-22 VITALS
HEIGHT: 64 IN | OXYGEN SATURATION: 100 % | DIASTOLIC BLOOD PRESSURE: 89 MMHG | BODY MASS INDEX: 30.79 KG/M2 | WEIGHT: 180.34 LBS | HEART RATE: 88 BPM | TEMPERATURE: 98.3 F | RESPIRATION RATE: 16 BRPM | SYSTOLIC BLOOD PRESSURE: 129 MMHG

## 2024-06-22 DIAGNOSIS — M25.532 LEFT WRIST PAIN: ICD-10-CM

## 2024-06-22 DIAGNOSIS — T14.8XXA SUPERFICIAL ABRASION: Primary | ICD-10-CM

## 2024-06-22 PROCEDURE — 6370000000 HC RX 637 (ALT 250 FOR IP)

## 2024-06-22 PROCEDURE — 99283 EMERGENCY DEPT VISIT LOW MDM: CPT

## 2024-06-22 PROCEDURE — 73110 X-RAY EXAM OF WRIST: CPT

## 2024-06-22 PROCEDURE — 73070 X-RAY EXAM OF ELBOW: CPT

## 2024-06-22 RX ORDER — GINSENG 100 MG
CAPSULE ORAL
Status: COMPLETED | OUTPATIENT
Start: 2024-06-22 | End: 2024-06-22

## 2024-06-22 RX ORDER — IBUPROFEN 400 MG/1
600 TABLET ORAL
Status: COMPLETED | OUTPATIENT
Start: 2024-06-22 | End: 2024-06-22

## 2024-06-22 RX ADMIN — BACITRACIN 1 EACH: 500 OINTMENT TOPICAL at 10:27

## 2024-06-22 RX ADMIN — IBUPROFEN 600 MG: 400 TABLET, FILM COATED ORAL at 10:26

## 2024-06-22 ASSESSMENT — PAIN DESCRIPTION - DESCRIPTORS: DESCRIPTORS: SORE

## 2024-06-22 ASSESSMENT — PAIN SCALES - GENERAL
PAINLEVEL_OUTOF10: 8
PAINLEVEL_OUTOF10: 0

## 2024-06-22 ASSESSMENT — PAIN DESCRIPTION - ORIENTATION: ORIENTATION: LEFT

## 2024-06-22 ASSESSMENT — PAIN - FUNCTIONAL ASSESSMENT
PAIN_FUNCTIONAL_ASSESSMENT: ACTIVITIES ARE NOT PREVENTED
PAIN_FUNCTIONAL_ASSESSMENT: 0-10

## 2024-06-22 ASSESSMENT — PAIN DESCRIPTION - LOCATION: LOCATION: ARM

## 2024-06-22 NOTE — ED PROVIDER NOTES
[facility] EMERGENCY DEP  EMERGENCY DEPARTMENT ENCOUNTER      Pt Name: Darlin Montenegro  MRN: [id number]  Birthdate 1995  Date of evaluation: 6/22/2024  Provider: Jensen Astudillo PA-C    CHIEF COMPLAINT       Chief Complaint   Patient presents with    Laceration         HISTORY OF PRESENT ILLNESS   (Location/Symptom, Timing/Onset, Context/Setting, Quality, Duration, Modifying Factors, Severity)  Note limiting factors.   29-year-old female with no significant past medical history presents with complaint of left arm pain after a fall.  Patient states that she was at work when she tripped on a step, causing her to fall.  She reports hitting her left arm on a machine.  Denies hitting her head.  No loss of consciousness.  She is not currently on any blood thinners.  Denies headache, dizziness, chest pain, shortness of breath, or any other complaints prior to the fall.  She has a small abrasion on the left elbow.  She reports that her elbow and wrist are hurting.  No medications taken prior to arrival.  No other complaints at this time.            Review of External Medical Records:     Nursing Notes were reviewed.    REVIEW OF SYSTEMS    (2-9 systems for level 4, 10 or more for level 5)     Review of Systems    Except as noted above the remainder of the review of systems was reviewed and negative.       PAST MEDICAL HISTORY   No past medical history on file.      SURGICAL HISTORY       Past Surgical History:   Procedure Laterality Date    COLONOSCOPY N/A 5/24/2022    COLONOSCOPY performed by Simon Matthew MD at [facility] ENDOSCOPY    COLONOSCOPY,DIAGNOSTIC  5/24/2022              CURRENT MEDICATIONS       Discharge Medication List as of 6/22/2024 12:41 PM        CONTINUE these medications which have NOT CHANGED    Details   ketorolac (TORADOL) 10 MG tablet Take 1 tablet by mouth every 6 hours as needed for Pain Do not take for more than 4 days, Disp-16 tablet, R-0Normal      Acetaminophen-Caffeine (EXCEDRIN  General: Skin is warm and dry.      Capillary Refill: Capillary refill takes less than 2 seconds.      Coloration: Skin is not pale.      Findings: No rash.   Neurological:      General: No focal deficit present.      Mental Status: She is alert and oriented to person, place, and time. Mental status is at baseline.         DIAGNOSTIC RESULTS     EKG: All EKG's are interpreted by the Emergency Department Physician who either signs or Co-signs this chart in the absence of a cardiologist.        RADIOLOGY:   Non-plain film images such as CT, Ultrasound and MRI are read by the radiologist. Plain radiographic images are visualized and preliminarily interpreted by the emergency physician with the below findings:        Interpretation per the Radiologist below, if available at the time of this note:    XR WRIST LEFT (MIN 3 VIEWS)   Final Result   No acute abnormality.      EXAM: XR WRIST LEFT (MIN 3 VIEWS), XR ELBOW LEFT (2 VIEWS)      INDICATION: fall, wrist pain      COMPARISON: None.      FINDINGS: Three  views of the left elbow demonstrate no fracture, dislocation,   effusion or other abnormality.        IMPRESSION: No acute abnormality.      Electronically signed by João Damico MD      XR ELBOW LEFT (2 VIEWS)   Final Result   No acute abnormality.      EXAM: XR WRIST LEFT (MIN 3 VIEWS), XR ELBOW LEFT (2 VIEWS)      INDICATION: fall, wrist pain      COMPARISON: None.      FINDINGS: Three  views of the left elbow demonstrate no fracture, dislocation,   effusion or other abnormality.        IMPRESSION: No acute abnormality.      Electronically signed by João Damico MD           LABS:  Labs Reviewed - No data to display    All other labs were within normal range or not returned as of this dictation.    EMERGENCY DEPARTMENT COURSE and DIFFERENTIAL DIAGNOSIS/MDM:   Vitals:    Vitals:    06/22/24 1008   BP: 129/89   Pulse: 88   Resp: 16   Temp: 98.3 °F (36.8 °C)   TempSrc: Temporal   SpO2: 100%   Weight: 81.8 kg (180 lb

## 2024-07-01 ENCOUNTER — TELEPHONE (OUTPATIENT)
Age: 29
End: 2024-07-01

## 2024-07-01 NOTE — TELEPHONE ENCOUNTER
Called pt to follow up on FA and Medicaid questions. Will mail her FA application.She might be eligible for Medicaid.

## 2024-07-02 ENCOUNTER — CLINICAL DOCUMENTATION (OUTPATIENT)
Age: 29
End: 2024-07-02

## 2024-07-02 ENCOUNTER — TELEPHONE (OUTPATIENT)
Age: 29
End: 2024-07-02

## 2024-07-31 ENCOUNTER — OFFICE VISIT (OUTPATIENT)
Age: 29
End: 2024-07-31

## 2024-07-31 VITALS
HEART RATE: 87 BPM | BODY MASS INDEX: 31.07 KG/M2 | DIASTOLIC BLOOD PRESSURE: 72 MMHG | SYSTOLIC BLOOD PRESSURE: 115 MMHG | TEMPERATURE: 98.8 F | OXYGEN SATURATION: 98 % | WEIGHT: 181 LBS

## 2024-07-31 DIAGNOSIS — H57.89 EYE IRRITATION: ICD-10-CM

## 2024-07-31 DIAGNOSIS — R51.9 NONINTRACTABLE HEADACHE, UNSPECIFIED CHRONICITY PATTERN, UNSPECIFIED HEADACHE TYPE: Primary | ICD-10-CM

## 2024-07-31 PROCEDURE — 99213 OFFICE O/P EST LOW 20 MIN: CPT | Performed by: FAMILY MEDICINE

## 2024-07-31 RX ORDER — KETOTIFEN FUMARATE 0.35 MG/ML
1 SOLUTION/ DROPS OPHTHALMIC 2 TIMES DAILY
Qty: 1 ML | Refills: 2 | Status: SHIPPED | OUTPATIENT
Start: 2024-07-31 | End: 2024-08-10

## 2024-07-31 ASSESSMENT — PATIENT HEALTH QUESTIONNAIRE - PHQ9
SUM OF ALL RESPONSES TO PHQ9 QUESTIONS 1 & 2: 0
SUM OF ALL RESPONSES TO PHQ QUESTIONS 1-9: 0
2. FEELING DOWN, DEPRESSED OR HOPELESS: NOT AT ALL
SUM OF ALL RESPONSES TO PHQ QUESTIONS 1-9: 0
SUM OF ALL RESPONSES TO PHQ9 QUESTIONS 1 & 2: 0
1. LITTLE INTEREST OR PLEASURE IN DOING THINGS: NOT AT ALL
2. FEELING DOWN, DEPRESSED OR HOPELESS: NOT AT ALL
1. LITTLE INTEREST OR PLEASURE IN DOING THINGS: NOT AT ALL
SUM OF ALL RESPONSES TO PHQ QUESTIONS 1-9: 0

## 2024-07-31 ASSESSMENT — ENCOUNTER SYMPTOMS
SINUS PRESSURE: 0
SINUS PAIN: 0
EYE REDNESS: 1
SORE THROAT: 0

## 2024-07-31 NOTE — PROGRESS NOTES
Darlin Montenegro (:  1995) is a 29 y.o. female,Established patient, here for evaluation of the following chief complaint(s):  Headache (Follow up)      Assessment & Plan   Darlin was seen today for headache.    Diagnoses and all orders for this visit:    Nonintractable headache, unspecified chronicity pattern, unspecified headache type    Eye irritation  -     ketotifen fumarate (ZADITOR) 0.035 % ophthalmic solution; Place 1 drop into both eyes 2 times daily for 10 days    Headaches are less frequent now, treatment options discussed,  at this point she would like not to add medications  Has nexplanon on left arm, would like removed    Subjective   HPI  Patient states she finished antibiotic treatment,  sensation of numbness of the face improved.  She continues to have episodes of headaches.  She may have 2 episodes per week.  She has a nexplanon, and since having it placed she has had problems with headache.  Also has noticed weight gain.  She would like it removed if possible.    Patient has been having irritation of the eyes,  they become red,and itchy  Review of Systems   Constitutional:  Negative for fatigue.   HENT:  Negative for sinus pressure, sinus pain and sore throat.    Eyes:  Positive for redness.   Cardiovascular: Negative.    Neurological:  Positive for headaches.   /72 (Site: Right Upper Arm, Position: Sitting, Cuff Size: Large Adult)   Pulse 87   Temp 98.8 °F (37.1 °C) (Temporal)   Wt 82.1 kg (181 lb)   LMP 2024   SpO2 98%   BMI 31.07 kg/m²   Objective   Physical Exam  Constitutional:       General: She is not in acute distress.  HENT:      Head: Normocephalic.      Comments: Frontal and maxillary sinus tenderness     Right Ear: Tympanic membrane normal.      Left Ear: Tympanic membrane normal.      Nose: Nose normal.      Mouth/Throat:      Mouth: Mucous membranes are moist.      Pharynx: No oropharyngeal exudate or posterior oropharyngeal erythema.

## 2024-07-31 NOTE — PROGRESS NOTES
Darlin Montenegro seen at discharge. Full name and  verified; After visit Summary was given. RN reviewed today's visit with patient, including when it is recommended to return for follow-up visit in 4 months for procedure appointment-Nexplanon removal with Dr. Raza. RN reviewed the provider's instructions (including medication instructions) with the patient, answering all questions to her satisfaction. Patient verbalized understanding. Due to language barrier, an  ID: [id number]  assisted during this encounter.   DANITA GRACE RN

## 2024-07-31 NOTE — PROGRESS NOTES
Coordination of Care  1. Have you been to the ER, urgent care clinic since your last visit?  Hospitalized since your last visit? no    2. Have you seen or consulted any other health care providers outside of the [facility] since your last visit?  Include any pap smears or colon screening. no    Does the patient need refills? yes    Learning Assessment Complete? yes  Depression Screening complete in the past 12 months? yes

## 2024-09-06 ENCOUNTER — OFFICE VISIT (OUTPATIENT)
Age: 29
End: 2024-09-06

## 2024-09-06 VITALS
HEART RATE: 79 BPM | SYSTOLIC BLOOD PRESSURE: 126 MMHG | WEIGHT: 180 LBS | BODY MASS INDEX: 30.9 KG/M2 | DIASTOLIC BLOOD PRESSURE: 81 MMHG | OXYGEN SATURATION: 100 % | TEMPERATURE: 98.3 F

## 2024-09-06 DIAGNOSIS — Z30.46 ENCOUNTER FOR NEXPLANON REMOVAL: Primary | ICD-10-CM

## 2024-09-06 RX ORDER — LIDOCAINE HYDROCHLORIDE 20 MG/ML
4 INJECTION, SOLUTION EPIDURAL; INFILTRATION; INTRACAUDAL; PERINEURAL ONCE
Status: COMPLETED | OUTPATIENT
Start: 2024-09-06 | End: 2024-09-06

## 2024-09-06 RX ADMIN — LIDOCAINE HYDROCHLORIDE 4 ML: 20 INJECTION, SOLUTION EPIDURAL; INFILTRATION; INTRACAUDAL; PERINEURAL at 16:55

## 2024-09-06 ASSESSMENT — ENCOUNTER SYMPTOMS
DIARRHEA: 0
COUGH: 0
SHORTNESS OF BREATH: 0
CONSTIPATION: 0
NAUSEA: 0
ABDOMINAL PAIN: 0

## 2024-09-06 ASSESSMENT — PATIENT HEALTH QUESTIONNAIRE - PHQ9
SUM OF ALL RESPONSES TO PHQ QUESTIONS 1-9: 0
1. LITTLE INTEREST OR PLEASURE IN DOING THINGS: NOT AT ALL
SUM OF ALL RESPONSES TO PHQ QUESTIONS 1-9: 0
SUM OF ALL RESPONSES TO PHQ9 QUESTIONS 1 & 2: 0
2. FEELING DOWN, DEPRESSED OR HOPELESS: NOT AT ALL

## 2024-09-06 NOTE — PATIENT INSTRUCTIONS
Can remove pressure bandage in 4-6 hours.   Steri strips will fall off on their own or can be removed after 72 hours.   Can take motrin or tylenol for soreness tonight or tomorrow

## 2024-09-06 NOTE — PROGRESS NOTES
Darlin Montenegro is a 29 y.o. female   Chief Complaint   Patient presents with    Procedure     Implant removal         ASSESSMENT AND PLAN:    1. Encounter for Nexplanon removal  Successful removal of intact nexplanon.  Care and return precautions reviewed.  Can go to health dept for contraceptive options.  - REMOVAL DRUG IMPLANT DEVICE  - lidocaine PF 2 % injection 4 mL        SUBJECTIVE:    HPI:  Darlin Montenegro is a 29 y.o. female who presents  for nexplanon removal. It was placed August 2023 and she has been suffering from headaches and malaise and would like it removed.    Review of Systems   Constitutional:  Negative for fatigue, fever and unexpected weight change.   Eyes:  Negative for visual disturbance.   Respiratory:  Negative for cough and shortness of breath.    Cardiovascular:  Negative for chest pain and palpitations.   Gastrointestinal:  Negative for abdominal pain, constipation, diarrhea and nausea.   Neurological:  Positive for headaches. Negative for dizziness.         /81 (Site: Right Upper Arm, Position: Sitting, Cuff Size: Large Adult)   Pulse 79   Temp 98.3 °F (36.8 °C) (Temporal)   Wt 81.6 kg (180 lb)   SpO2 100%   BMI 30.90 kg/m²     Physical Exam  Constitutional:       General: She is not in acute distress.     Appearance: Normal appearance.   Pulmonary:      Effort: Pulmonary effort is normal.   Skin:     Comments: Nexplanon palpated in LUE   Neurological:      Mental Status: She is alert and oriented to person, place, and time.   Psychiatric:         Mood and Affect: Mood normal.         Behavior: Behavior normal.        NEXPLANON PROCEDURE NOTE:    Anesthesia: 2% Lidocaine 4 ml   Procedure: Consent obtained. A time-out was performed prior to  initiating procedure to be sure of right patient and right location. The  area surrounding the Nexplanon was prepared with Choloraprep and draped  in the usual sterile manner. The site was anesthetized with 4ccs

## 2024-09-06 NOTE — PROGRESS NOTES
Pt's name and  verified. AVS provided. Medication reviewed and education provided. Wound care instructions provided and reviewed. S/sx of infection to report reviewed and when to go to urgent care/ED. Pt verbalizes understanding. Health department list provided per pt request. Time allowed for questions, no questions at this time.  Jaya assisted. Elke Malave RN

## 2025-09-03 ENCOUNTER — TELEPHONE (OUTPATIENT)
Age: 30
End: 2025-09-03

## (undated) DEVICE — SYR 3ML LL TIP 1/10ML GRAD --

## (undated) DEVICE — SET ADMIN 16ML TBNG L100IN 2 Y INJ SITE IV PIGGY BK DISP

## (undated) DEVICE — Device

## (undated) DEVICE — TOWEL 4 PLY TISS 19X30 SUE WHT

## (undated) DEVICE — CATH IV AUTOGRD BC PNK 20GA 25 -- INSYTE

## (undated) DEVICE — ELECTRODE,RADIOTRANSLUCENT,FOAM,5PK: Brand: MEDLINE

## (undated) DEVICE — BASIN EMSIS 16OZ GRAPHITE PLAS KID SHP MOLD GRAD FOR ORAL

## (undated) DEVICE — SOLIDIFIER FLD 2OZ 1500CC N DISINF IN BTL DISP SAFESORB

## (undated) DEVICE — HYPODERMIC SAFETY NEEDLE: Brand: MAGELLAN

## (undated) DEVICE — SYR 10ML LUER LOK 1/5ML GRAD --

## (undated) DEVICE — Z DISCONTINUED PER MEDLINE LINE GAS SAMPLING O2/CO2 LNG AD 13 FT NSL W/ TBNG FILTERLINE

## (undated) DEVICE — NEONATAL-ADULT SPO2 SENSOR: Brand: NELLCOR

## (undated) DEVICE — 1200 GUARD II KIT W/5MM TUBE W/O VAC TUBE: Brand: GUARDIAN